# Patient Record
Sex: FEMALE | Race: WHITE | HISPANIC OR LATINO
[De-identification: names, ages, dates, MRNs, and addresses within clinical notes are randomized per-mention and may not be internally consistent; named-entity substitution may affect disease eponyms.]

---

## 2021-11-09 PROBLEM — Z00.00 ENCOUNTER FOR PREVENTIVE HEALTH EXAMINATION: Status: ACTIVE | Noted: 2021-11-09

## 2021-11-16 ENCOUNTER — APPOINTMENT (OUTPATIENT)
Dept: SURGICAL ONCOLOGY | Facility: CLINIC | Age: 61
End: 2021-11-16

## 2021-11-25 ENCOUNTER — TRANSCRIPTION ENCOUNTER (OUTPATIENT)
Age: 61
End: 2021-11-25

## 2021-11-26 ENCOUNTER — INPATIENT (INPATIENT)
Facility: HOSPITAL | Age: 61
LOS: 9 days | Discharge: HOME | DRG: 330 | End: 2021-12-06
Attending: SURGERY | Admitting: SURGERY
Payer: MEDICAID

## 2021-11-26 VITALS
RESPIRATION RATE: 16 BRPM | SYSTOLIC BLOOD PRESSURE: 129 MMHG | OXYGEN SATURATION: 96 % | DIASTOLIC BLOOD PRESSURE: 91 MMHG | HEART RATE: 80 BPM | TEMPERATURE: 99 F

## 2021-11-26 DIAGNOSIS — Z90.49 ACQUIRED ABSENCE OF OTHER SPECIFIED PARTS OF DIGESTIVE TRACT: Chronic | ICD-10-CM

## 2021-11-26 DIAGNOSIS — K56.609 UNSPECIFIED INTESTINAL OBSTRUCTION, UNSPECIFIED AS TO PARTIAL VERSUS COMPLETE OBSTRUCTION: ICD-10-CM

## 2021-11-26 DIAGNOSIS — Z90.710 ACQUIRED ABSENCE OF BOTH CERVIX AND UTERUS: Chronic | ICD-10-CM

## 2021-11-26 LAB
ABO RH CONFIRMATION: SIGNIFICANT CHANGE UP
ALBUMIN SERPL ELPH-MCNC: 3.4 G/DL — LOW (ref 3.5–5)
ALP SERPL-CCNC: 65 U/L — SIGNIFICANT CHANGE UP (ref 40–120)
ALT FLD-CCNC: 16 U/L DA — SIGNIFICANT CHANGE UP (ref 10–60)
ANION GAP SERPL CALC-SCNC: 14 MMOL/L — SIGNIFICANT CHANGE UP (ref 5–17)
APTT BLD: 28.7 SEC — SIGNIFICANT CHANGE UP (ref 27.5–35.5)
AST SERPL-CCNC: 12 U/L — SIGNIFICANT CHANGE UP (ref 10–40)
BASOPHILS # BLD AUTO: 0.06 K/UL — SIGNIFICANT CHANGE UP (ref 0–0.2)
BASOPHILS NFR BLD AUTO: 0.4 % — SIGNIFICANT CHANGE UP (ref 0–2)
BILIRUB SERPL-MCNC: 1.5 MG/DL — HIGH (ref 0.2–1.2)
BUN SERPL-MCNC: 26 MG/DL — HIGH (ref 7–18)
CALCIUM SERPL-MCNC: 9.7 MG/DL — SIGNIFICANT CHANGE UP (ref 8.4–10.5)
CHLORIDE SERPL-SCNC: 98 MMOL/L — SIGNIFICANT CHANGE UP (ref 96–108)
CO2 SERPL-SCNC: 26 MMOL/L — SIGNIFICANT CHANGE UP (ref 22–31)
CREAT SERPL-MCNC: 1.26 MG/DL — SIGNIFICANT CHANGE UP (ref 0.5–1.3)
EOSINOPHIL # BLD AUTO: 0.09 K/UL — SIGNIFICANT CHANGE UP (ref 0–0.5)
EOSINOPHIL NFR BLD AUTO: 0.6 % — SIGNIFICANT CHANGE UP (ref 0–6)
GLUCOSE SERPL-MCNC: 77 MG/DL — SIGNIFICANT CHANGE UP (ref 70–99)
HCT VFR BLD CALC: 39.5 % — SIGNIFICANT CHANGE UP (ref 34.5–45)
HGB BLD-MCNC: 13.3 G/DL — SIGNIFICANT CHANGE UP (ref 11.5–15.5)
IMM GRANULOCYTES NFR BLD AUTO: 0.5 % — SIGNIFICANT CHANGE UP (ref 0–1.5)
INR BLD: 1.05 RATIO — SIGNIFICANT CHANGE UP (ref 0.88–1.16)
LACTATE SERPL-SCNC: 1.4 MMOL/L — SIGNIFICANT CHANGE UP (ref 0.7–2)
LACTATE SERPL-SCNC: 2.7 MMOL/L — HIGH (ref 0.7–2)
LYMPHOCYTES # BLD AUTO: 1.15 K/UL — SIGNIFICANT CHANGE UP (ref 1–3.3)
LYMPHOCYTES # BLD AUTO: 7.2 % — LOW (ref 13–44)
MCHC RBC-ENTMCNC: 30.7 PG — SIGNIFICANT CHANGE UP (ref 27–34)
MCHC RBC-ENTMCNC: 33.7 GM/DL — SIGNIFICANT CHANGE UP (ref 32–36)
MCV RBC AUTO: 91.2 FL — SIGNIFICANT CHANGE UP (ref 80–100)
MONOCYTES # BLD AUTO: 1.07 K/UL — HIGH (ref 0–0.9)
MONOCYTES NFR BLD AUTO: 6.7 % — SIGNIFICANT CHANGE UP (ref 2–14)
NEUTROPHILS # BLD AUTO: 13.45 K/UL — HIGH (ref 1.8–7.4)
NEUTROPHILS NFR BLD AUTO: 84.6 % — HIGH (ref 43–77)
NRBC # BLD: 0 /100 WBCS — SIGNIFICANT CHANGE UP (ref 0–0)
PLATELET # BLD AUTO: 275 K/UL — SIGNIFICANT CHANGE UP (ref 150–400)
POTASSIUM SERPL-MCNC: 3.7 MMOL/L — SIGNIFICANT CHANGE UP (ref 3.5–5.3)
POTASSIUM SERPL-SCNC: 3.7 MMOL/L — SIGNIFICANT CHANGE UP (ref 3.5–5.3)
PROT SERPL-MCNC: 7.2 G/DL — SIGNIFICANT CHANGE UP (ref 6–8.3)
PROTHROM AB SERPL-ACNC: 12.5 SEC — SIGNIFICANT CHANGE UP (ref 10.6–13.6)
RBC # BLD: 4.33 M/UL — SIGNIFICANT CHANGE UP (ref 3.8–5.2)
RBC # FLD: 12.2 % — SIGNIFICANT CHANGE UP (ref 10.3–14.5)
SARS-COV-2 RNA SPEC QL NAA+PROBE: SIGNIFICANT CHANGE UP
SODIUM SERPL-SCNC: 138 MMOL/L — SIGNIFICANT CHANGE UP (ref 135–145)
WBC # BLD: 15.9 K/UL — HIGH (ref 3.8–10.5)
WBC # FLD AUTO: 15.9 K/UL — HIGH (ref 3.8–10.5)

## 2021-11-26 PROCEDURE — 44180 LAP ENTEROLYSIS: CPT | Mod: 22

## 2021-11-26 PROCEDURE — 71045 X-RAY EXAM CHEST 1 VIEW: CPT | Mod: 26

## 2021-11-26 PROCEDURE — 99223 1ST HOSP IP/OBS HIGH 75: CPT | Mod: 57

## 2021-11-26 PROCEDURE — 74177 CT ABD & PELVIS W/CONTRAST: CPT | Mod: 26,MA

## 2021-11-26 PROCEDURE — 99285 EMERGENCY DEPT VISIT HI MDM: CPT

## 2021-11-26 RX ORDER — MORPHINE SULFATE 50 MG/1
4 CAPSULE, EXTENDED RELEASE ORAL ONCE
Refills: 0 | Status: DISCONTINUED | OUTPATIENT
Start: 2021-11-26 | End: 2021-11-26

## 2021-11-26 RX ORDER — SODIUM CHLORIDE 9 MG/ML
1000 INJECTION INTRAMUSCULAR; INTRAVENOUS; SUBCUTANEOUS
Refills: 0 | Status: DISCONTINUED | OUTPATIENT
Start: 2021-11-26 | End: 2021-11-27

## 2021-11-26 RX ORDER — HYDROMORPHONE HYDROCHLORIDE 2 MG/ML
0.5 INJECTION INTRAMUSCULAR; INTRAVENOUS; SUBCUTANEOUS
Refills: 0 | Status: DISCONTINUED | OUTPATIENT
Start: 2021-11-26 | End: 2021-11-28

## 2021-11-26 RX ORDER — HYDROMORPHONE HYDROCHLORIDE 2 MG/ML
1 INJECTION INTRAMUSCULAR; INTRAVENOUS; SUBCUTANEOUS
Refills: 0 | Status: DISCONTINUED | OUTPATIENT
Start: 2021-11-26 | End: 2021-11-26

## 2021-11-26 RX ORDER — METOPROLOL TARTRATE 50 MG
5 TABLET ORAL EVERY 6 HOURS
Refills: 0 | Status: DISCONTINUED | OUTPATIENT
Start: 2021-11-26 | End: 2021-11-26

## 2021-11-26 RX ORDER — DEXTROSE 50 % IN WATER 50 %
12.5 SYRINGE (ML) INTRAVENOUS ONCE
Refills: 0 | Status: DISCONTINUED | OUTPATIENT
Start: 2021-11-26 | End: 2021-11-26

## 2021-11-26 RX ORDER — SODIUM CHLORIDE 9 MG/ML
1000 INJECTION, SOLUTION INTRAVENOUS
Refills: 0 | Status: DISCONTINUED | OUTPATIENT
Start: 2021-11-26 | End: 2021-11-26

## 2021-11-26 RX ORDER — PANTOPRAZOLE SODIUM 20 MG/1
40 TABLET, DELAYED RELEASE ORAL DAILY
Refills: 0 | Status: DISCONTINUED | OUTPATIENT
Start: 2021-11-26 | End: 2021-11-26

## 2021-11-26 RX ORDER — METOCLOPRAMIDE HCL 10 MG
10 TABLET ORAL ONCE
Refills: 0 | Status: COMPLETED | OUTPATIENT
Start: 2021-11-26 | End: 2021-11-28

## 2021-11-26 RX ORDER — ONDANSETRON 8 MG/1
4 TABLET, FILM COATED ORAL EVERY 6 HOURS
Refills: 0 | Status: DISCONTINUED | OUTPATIENT
Start: 2021-11-26 | End: 2021-11-26

## 2021-11-26 RX ORDER — INSULIN LISPRO 100/ML
VIAL (ML) SUBCUTANEOUS
Refills: 0 | Status: DISCONTINUED | OUTPATIENT
Start: 2021-11-26 | End: 2021-11-26

## 2021-11-26 RX ORDER — HYDROMORPHONE HYDROCHLORIDE 2 MG/ML
0.5 INJECTION INTRAMUSCULAR; INTRAVENOUS; SUBCUTANEOUS
Refills: 0 | Status: DISCONTINUED | OUTPATIENT
Start: 2021-11-26 | End: 2021-11-27

## 2021-11-26 RX ORDER — ONDANSETRON 8 MG/1
4 TABLET, FILM COATED ORAL ONCE
Refills: 0 | Status: COMPLETED | OUTPATIENT
Start: 2021-11-26 | End: 2021-11-26

## 2021-11-26 RX ORDER — HYDROMORPHONE HYDROCHLORIDE 2 MG/ML
0.5 INJECTION INTRAMUSCULAR; INTRAVENOUS; SUBCUTANEOUS
Refills: 0 | Status: DISCONTINUED | OUTPATIENT
Start: 2021-11-26 | End: 2021-11-26

## 2021-11-26 RX ORDER — ENOXAPARIN SODIUM 100 MG/ML
40 INJECTION SUBCUTANEOUS DAILY
Refills: 0 | Status: DISCONTINUED | OUTPATIENT
Start: 2021-11-26 | End: 2021-11-26

## 2021-11-26 RX ORDER — SODIUM CHLORIDE 9 MG/ML
1000 INJECTION INTRAMUSCULAR; INTRAVENOUS; SUBCUTANEOUS
Refills: 0 | Status: DISCONTINUED | OUTPATIENT
Start: 2021-11-26 | End: 2021-11-26

## 2021-11-26 RX ORDER — DEXTROSE 50 % IN WATER 50 %
15 SYRINGE (ML) INTRAVENOUS ONCE
Refills: 0 | Status: DISCONTINUED | OUTPATIENT
Start: 2021-11-26 | End: 2021-11-26

## 2021-11-26 RX ORDER — SODIUM CHLORIDE 9 MG/ML
1000 INJECTION INTRAMUSCULAR; INTRAVENOUS; SUBCUTANEOUS ONCE
Refills: 0 | Status: COMPLETED | OUTPATIENT
Start: 2021-11-26 | End: 2021-11-26

## 2021-11-26 RX ORDER — GLUCAGON INJECTION, SOLUTION 0.5 MG/.1ML
1 INJECTION, SOLUTION SUBCUTANEOUS ONCE
Refills: 0 | Status: DISCONTINUED | OUTPATIENT
Start: 2021-11-26 | End: 2021-11-26

## 2021-11-26 RX ORDER — KETOROLAC TROMETHAMINE 30 MG/ML
15 SYRINGE (ML) INJECTION EVERY 6 HOURS
Refills: 0 | Status: DISCONTINUED | OUTPATIENT
Start: 2021-11-26 | End: 2021-11-28

## 2021-11-26 RX ORDER — DEXTROSE 50 % IN WATER 50 %
25 SYRINGE (ML) INTRAVENOUS ONCE
Refills: 0 | Status: DISCONTINUED | OUTPATIENT
Start: 2021-11-26 | End: 2021-11-26

## 2021-11-26 RX ORDER — ACETAMINOPHEN 500 MG
1000 TABLET ORAL ONCE
Refills: 0 | Status: COMPLETED | OUTPATIENT
Start: 2021-11-26 | End: 2021-11-26

## 2021-11-26 RX ADMIN — Medication 400 MILLIGRAM(S): at 22:56

## 2021-11-26 RX ADMIN — PANTOPRAZOLE SODIUM 40 MILLIGRAM(S): 20 TABLET, DELAYED RELEASE ORAL at 14:02

## 2021-11-26 RX ADMIN — Medication 1000 MILLIGRAM(S): at 23:30

## 2021-11-26 RX ADMIN — ENOXAPARIN SODIUM 40 MILLIGRAM(S): 100 INJECTION SUBCUTANEOUS at 14:01

## 2021-11-26 RX ADMIN — SODIUM CHLORIDE 1000 MILLILITER(S): 9 INJECTION INTRAMUSCULAR; INTRAVENOUS; SUBCUTANEOUS at 06:06

## 2021-11-26 RX ADMIN — SODIUM CHLORIDE 130 MILLILITER(S): 9 INJECTION INTRAMUSCULAR; INTRAVENOUS; SUBCUTANEOUS at 14:02

## 2021-11-26 RX ADMIN — MORPHINE SULFATE 4 MILLIGRAM(S): 50 CAPSULE, EXTENDED RELEASE ORAL at 06:06

## 2021-11-26 RX ADMIN — ONDANSETRON 4 MILLIGRAM(S): 8 TABLET, FILM COATED ORAL at 06:06

## 2021-11-26 RX ADMIN — MORPHINE SULFATE 4 MILLIGRAM(S): 50 CAPSULE, EXTENDED RELEASE ORAL at 09:51

## 2021-11-26 NOTE — H&P ADULT - NSHPLABSRESULTS_GEN_ALL_CORE
13.3   15.90 )-----------( 275      ( 26 Nov 2021 06:03 )             39.5   11-26    138  |  98  |  26<H>  ----------------------------<  77  3.7   |  26  |  1.26    Ca    9.7      26 Nov 2021 06:03    TPro  7.2  /  Alb  3.4<L>  /  TBili  1.5<H>  /  DBili  x   /  AST  12  /  ALT  16  /  AlkPhos  65  11-26    Lactate 2.7 > 1.4    < from: CT Abdomen and Pelvis w/ IV Cont (11.26.21 @ 07:01) >  EXAM:  CT ABDOMEN AND PELVIS IC                        PROCEDURE DATE:  11/26/2021    INTERPRETATION:  CLINICAL INFORMATION: Abdominal pain and vomiting.    COMPARISON: None.    CONTRAST/COMPLICATIONS:  IV Contrast: Omnipaque 350  90 cc administered   10 cc discarded  Oral Contrast: NONE  Complications: None reported at time of study completion    PROCEDURE:  CT of the Abdomen and Pelvis was performed.  Sagittal and coronal reformats were performed.    FINDINGS:  LOWER CHEST: Within normal limits.  LIVER: Focal hepatic steatosis along the falciform ligament.  BILE DUCTS: Normal caliber.  GALLBLADDER: Gallbladder distended with mild gallbladder wall thickening. Trace nonspecific pericholecystic fluid.  SPLEEN: Within normal limits.  PANCREAS: Within normal limits.  ADRENALS: Within normal limits.  KIDNEYS/URETERS: Kidneys enhance symmetrically without hydronephrosis. Focal areas of left renal cortical thinning/scarring. Subcentimeter low-attenuation lesion in the right kidney which is too small to characterize.  BLADDER: Within normal limits.  REPRODUCTIVE ORGANS: Uterus is not visualized. No pelvic mass or cyst.  BOWEL and peritoneum: Numerous dilated fluid-filled small bowel loops throughout the central abdomen withmesenteric edema, interloop fluid, and small volume ascites. Abrupt transition point in the right lower quadrant at a fecal lies segment (series 2:109). Distal small bowel loops are decompressed. No pneumoperitoneum. Appendix is not visualized. Mild colonic diverticulosis without evidence of diverticulitis. No mesenteric lymphadenopathy.  VESSELS: Within normal limits.  RETROPERITONEUM/LYMPH NODES: No lymphadenopathy.  ABDOMINAL WALL: Tiny fat-containing umbilical hernia. Postsurgical changes.  BONES: Degenerative changes of the spine.    IMPRESSION:  High-grade small bowel obstruction with abrupt transition point in the right lower quadrant (series 2:109). Associated mesenteric edema, interloop fluid, and small volume ascites.    --- Endof Report ---    ALONZO BECERRA DO; Attending Radiologist  This document has been electronically signed. Nov 26 2021  7:43AM    < end of copied text >

## 2021-11-26 NOTE — ED PROVIDER NOTE - PROGRESS NOTE DETAILS
[place]:  Pt received in signout from Dr. Ortiz pending CT.  CT shows SBO.  Pt initially could not be found but may have been in lavatory.  Pt now reassessed and still has abdominal pain but no recent vomiting (since 5 am).  Lactate repeat ordered.  Surgery paged, pending response. Pt seen by surgery PA and will be admitted to Dr. Friend.  NG tube was placed.

## 2021-11-26 NOTE — H&P ADULT - ASSESSMENT
61F with PSH of open hysterectomy and appendectomy presenting with abdominal pain associated with nausea/vomiting. Found to have small bowel obstruction with transition point in RLQ    -Admit to surgery under Dr. Friend  -NGT placed at bedside, f/u abdominal xray  -NPO, IVF  -Serial abdominal exams  -If patient clinically worsens plan for possible diagnostic laparoscopy  -Hold pain medications  -C/w Home Medication  -DVT prophylaxis   61F with PSH of open hysterectomy and appendectomy presenting with abdominal pain associated with nausea/vomiting. Found to have small bowel obstruction with transition point in RLQ    -Admit to surgery under Dr. Friend  -NGT placed at bedside, f/u xray  -NPO, IVF  -Serial abdominal exams  -If patient clinically worsens plan for possible diagnostic laparoscopy  -Hold pain medications  -C/w Home Medication  -DVT prophylaxis

## 2021-11-26 NOTE — H&P ADULT - NSHPPHYSICALEXAM_GEN_ALL_CORE
Vital Signs Last 24 Hrs  T(C): 37.2 (26 Nov 2021 05:34), Max: 37.2 (26 Nov 2021 05:34)  T(F): 98.9 (26 Nov 2021 05:34), Max: 98.9 (26 Nov 2021 05:34)  HR: 80 (26 Nov 2021 05:34) (80 - 80)  BP: 129/91 (26 Nov 2021 05:34) (129/91 - 129/91)  RR: 16 (26 Nov 2021 05:34) (16 - 16)  SpO2: 96% (26 Nov 2021 05:34) (96% - 96%)    Gen: A&Ox3, NAD  ENT: No nasal deformity, nares patent, septum intact without deviation  Chest: respiration unlabored  Abdomen: Soft, mildly distended, diffuse abdominal tenderness with right lower quadrant localized peritonitis.  Ext: no calf tenderness, no edema

## 2021-11-26 NOTE — PATIENT PROFILE ADULT - STATED REASON FOR ADMISSION
Abdominal pain and episodes of vomit Abdominal pain and episodes of vomit, then she went to the ED.

## 2021-11-26 NOTE — ED PROVIDER NOTE - OBJECTIVE STATEMENT
61 year old female PMH HTN, DM coming in with abd pain, distention, nausea, and vomiting feces. states started 2 days ago and progressively getting worse. initially thought it was a virus but this morning vomited up what appeared to be feces. denies all other complaints. never had this happen before. hx exlap/hysterectomy. not passing gas and no BM for 2 days.

## 2021-11-26 NOTE — H&P ADULT - HISTORY OF PRESENT ILLNESS
61F with PMHx of HTN, HLD, DM (on metformin) PSHx open hysterectomy, open appendectomy, and  presents with abdominal pain associated with nausea and feculent vomiting x 2 days. Pt states she taught it was a stomach virus but symptoms began to worsen over the last todays. Never experienced this in the past. Denied fever, chills, flatus, or bowel function over the last 2 days.

## 2021-11-26 NOTE — ED PROVIDER NOTE - CLINICAL SUMMARY MEDICAL DECISION MAKING FREE TEXT BOX
61 year old female with abd pain and N/V with feces. PE as above.  labs, ct abdomen, pain control, zofran, fluids bolus

## 2021-11-27 LAB
A1C WITH ESTIMATED AVERAGE GLUCOSE RESULT: 5.2 % — SIGNIFICANT CHANGE UP (ref 4–5.6)
ANION GAP SERPL CALC-SCNC: 10 MMOL/L — SIGNIFICANT CHANGE UP (ref 5–17)
ANISOCYTOSIS BLD QL: SLIGHT — SIGNIFICANT CHANGE UP
BASOPHILS # BLD AUTO: 0 K/UL — SIGNIFICANT CHANGE UP (ref 0–0.2)
BASOPHILS NFR BLD AUTO: 0 % — SIGNIFICANT CHANGE UP (ref 0–2)
BUN SERPL-MCNC: 29 MG/DL — HIGH (ref 7–18)
CALCIUM SERPL-MCNC: 7.5 MG/DL — LOW (ref 8.4–10.5)
CHLORIDE SERPL-SCNC: 107 MMOL/L — SIGNIFICANT CHANGE UP (ref 96–108)
CO2 SERPL-SCNC: 23 MMOL/L — SIGNIFICANT CHANGE UP (ref 22–31)
COVID-19 NUCLEOCAPSID GAM AB INTERP: NEGATIVE — SIGNIFICANT CHANGE UP
COVID-19 NUCLEOCAPSID TOTAL GAM ANTIBODY RESULT: 0.07 INDEX — SIGNIFICANT CHANGE UP
COVID-19 SPIKE DOMAIN AB INTERP: POSITIVE
COVID-19 SPIKE DOMAIN ANTIBODY RESULT: >250 U/ML — HIGH
CREAT SERPL-MCNC: 1.18 MG/DL — SIGNIFICANT CHANGE UP (ref 0.5–1.3)
EOSINOPHIL # BLD AUTO: 0 K/UL — SIGNIFICANT CHANGE UP (ref 0–0.5)
EOSINOPHIL NFR BLD AUTO: 0 % — SIGNIFICANT CHANGE UP (ref 0–6)
ESTIMATED AVERAGE GLUCOSE: 103 MG/DL — SIGNIFICANT CHANGE UP (ref 68–114)
GLUCOSE SERPL-MCNC: 73 MG/DL — SIGNIFICANT CHANGE UP (ref 70–99)
HCT VFR BLD CALC: 31.5 % — LOW (ref 34.5–45)
HCT VFR BLD CALC: 36.2 % — SIGNIFICANT CHANGE UP (ref 34.5–45)
HCV AB S/CO SERPL IA: 0.07 S/CO — SIGNIFICANT CHANGE UP (ref 0–0.99)
HCV AB SERPL-IMP: SIGNIFICANT CHANGE UP
HGB BLD-MCNC: 10.6 G/DL — LOW (ref 11.5–15.5)
HGB BLD-MCNC: 12.4 G/DL — SIGNIFICANT CHANGE UP (ref 11.5–15.5)
HYPOSEGMENTATION: PRESENT — SIGNIFICANT CHANGE UP
LYMPHOCYTES # BLD AUTO: 0.53 K/UL — LOW (ref 1–3.3)
LYMPHOCYTES # BLD AUTO: 20 % — SIGNIFICANT CHANGE UP (ref 13–44)
MANUAL SMEAR VERIFICATION: SIGNIFICANT CHANGE UP
MCHC RBC-ENTMCNC: 30.8 PG — SIGNIFICANT CHANGE UP (ref 27–34)
MCHC RBC-ENTMCNC: 31.2 PG — SIGNIFICANT CHANGE UP (ref 27–34)
MCHC RBC-ENTMCNC: 33.7 GM/DL — SIGNIFICANT CHANGE UP (ref 32–36)
MCHC RBC-ENTMCNC: 34.3 GM/DL — SIGNIFICANT CHANGE UP (ref 32–36)
MCV RBC AUTO: 89.8 FL — SIGNIFICANT CHANGE UP (ref 80–100)
MCV RBC AUTO: 92.6 FL — SIGNIFICANT CHANGE UP (ref 80–100)
MONOCYTES # BLD AUTO: 0.27 K/UL — SIGNIFICANT CHANGE UP (ref 0–0.9)
MONOCYTES NFR BLD AUTO: 10 % — SIGNIFICANT CHANGE UP (ref 2–14)
NEUTROPHILS # BLD AUTO: 1.86 K/UL — SIGNIFICANT CHANGE UP (ref 1.8–7.4)
NEUTROPHILS NFR BLD AUTO: 69 % — SIGNIFICANT CHANGE UP (ref 43–77)
NEUTS BAND # BLD: 1 % — SIGNIFICANT CHANGE UP (ref 0–8)
NRBC # BLD: 0 /100 WBCS — SIGNIFICANT CHANGE UP (ref 0–0)
NRBC # BLD: 0 /100 — SIGNIFICANT CHANGE UP (ref 0–0)
PLAT MORPH BLD: NORMAL — SIGNIFICANT CHANGE UP
PLATELET # BLD AUTO: 128 K/UL — LOW (ref 150–400)
PLATELET # BLD AUTO: 222 K/UL — SIGNIFICANT CHANGE UP (ref 150–400)
PLATELET COUNT - ESTIMATE: ABNORMAL
POLYCHROMASIA BLD QL SMEAR: SLIGHT — SIGNIFICANT CHANGE UP
POTASSIUM SERPL-MCNC: 3.5 MMOL/L — SIGNIFICANT CHANGE UP (ref 3.5–5.3)
POTASSIUM SERPL-SCNC: 3.5 MMOL/L — SIGNIFICANT CHANGE UP (ref 3.5–5.3)
RBC # BLD: 3.4 M/UL — LOW (ref 3.8–5.2)
RBC # BLD: 4.03 M/UL — SIGNIFICANT CHANGE UP (ref 3.8–5.2)
RBC # FLD: 12.5 % — SIGNIFICANT CHANGE UP (ref 10.3–14.5)
RBC # FLD: 12.7 % — SIGNIFICANT CHANGE UP (ref 10.3–14.5)
RBC BLD AUTO: ABNORMAL
SARS-COV-2 IGG+IGM SERPL QL IA: 0.07 INDEX — SIGNIFICANT CHANGE UP
SARS-COV-2 IGG+IGM SERPL QL IA: >250 U/ML — HIGH
SARS-COV-2 IGG+IGM SERPL QL IA: NEGATIVE — SIGNIFICANT CHANGE UP
SARS-COV-2 IGG+IGM SERPL QL IA: POSITIVE
SODIUM SERPL-SCNC: 140 MMOL/L — SIGNIFICANT CHANGE UP (ref 135–145)
WBC # BLD: 10.91 K/UL — HIGH (ref 3.8–10.5)
WBC # BLD: 2.65 K/UL — LOW (ref 3.8–10.5)
WBC # FLD AUTO: 10.91 K/UL — HIGH (ref 3.8–10.5)
WBC # FLD AUTO: 2.65 K/UL — LOW (ref 3.8–10.5)

## 2021-11-27 RX ORDER — SODIUM CHLORIDE 9 MG/ML
500 INJECTION, SOLUTION INTRAVENOUS ONCE
Refills: 0 | Status: COMPLETED | OUTPATIENT
Start: 2021-11-27 | End: 2021-11-27

## 2021-11-27 RX ORDER — SODIUM CHLORIDE 9 MG/ML
1000 INJECTION, SOLUTION INTRAVENOUS
Refills: 0 | Status: DISCONTINUED | OUTPATIENT
Start: 2021-11-27 | End: 2021-12-06

## 2021-11-27 RX ORDER — ENOXAPARIN SODIUM 100 MG/ML
40 INJECTION SUBCUTANEOUS DAILY
Refills: 0 | Status: DISCONTINUED | OUTPATIENT
Start: 2021-11-27 | End: 2021-11-28

## 2021-11-27 RX ORDER — DEXTROSE 50 % IN WATER 50 %
12.5 SYRINGE (ML) INTRAVENOUS ONCE
Refills: 0 | Status: DISCONTINUED | OUTPATIENT
Start: 2021-11-27 | End: 2021-12-06

## 2021-11-27 RX ORDER — DEXTROSE 50 % IN WATER 50 %
15 SYRINGE (ML) INTRAVENOUS ONCE
Refills: 0 | Status: DISCONTINUED | OUTPATIENT
Start: 2021-11-27 | End: 2021-12-06

## 2021-11-27 RX ORDER — ACETAMINOPHEN 500 MG
1000 TABLET ORAL ONCE
Refills: 0 | Status: COMPLETED | OUTPATIENT
Start: 2021-11-27 | End: 2021-11-29

## 2021-11-27 RX ORDER — GLUCAGON INJECTION, SOLUTION 0.5 MG/.1ML
1 INJECTION, SOLUTION SUBCUTANEOUS ONCE
Refills: 0 | Status: DISCONTINUED | OUTPATIENT
Start: 2021-11-27 | End: 2021-12-06

## 2021-11-27 RX ORDER — DEXTROSE 50 % IN WATER 50 %
25 SYRINGE (ML) INTRAVENOUS ONCE
Refills: 0 | Status: DISCONTINUED | OUTPATIENT
Start: 2021-11-27 | End: 2021-12-06

## 2021-11-27 RX ORDER — ZOLPIDEM TARTRATE 10 MG/1
5 TABLET ORAL AT BEDTIME
Refills: 0 | Status: DISCONTINUED | OUTPATIENT
Start: 2021-11-27 | End: 2021-12-04

## 2021-11-27 RX ADMIN — Medication 15 MILLIGRAM(S): at 01:20

## 2021-11-27 RX ADMIN — Medication 15 MILLIGRAM(S): at 05:43

## 2021-11-27 RX ADMIN — Medication 1 DROP(S): at 18:52

## 2021-11-27 RX ADMIN — Medication 15 MILLIGRAM(S): at 14:00

## 2021-11-27 RX ADMIN — ENOXAPARIN SODIUM 40 MILLIGRAM(S): 100 INJECTION SUBCUTANEOUS at 13:41

## 2021-11-27 RX ADMIN — Medication 15 MILLIGRAM(S): at 13:41

## 2021-11-27 RX ADMIN — Medication 15 MILLIGRAM(S): at 01:02

## 2021-11-27 RX ADMIN — SODIUM CHLORIDE 75 MILLILITER(S): 9 INJECTION INTRAMUSCULAR; INTRAVENOUS; SUBCUTANEOUS at 01:15

## 2021-11-27 RX ADMIN — Medication 15 MILLIGRAM(S): at 05:33

## 2021-11-27 RX ADMIN — ZOLPIDEM TARTRATE 5 MILLIGRAM(S): 10 TABLET ORAL at 20:57

## 2021-11-27 RX ADMIN — SODIUM CHLORIDE 500 MILLILITER(S): 9 INJECTION, SOLUTION INTRAVENOUS at 02:01

## 2021-11-27 RX ADMIN — SODIUM CHLORIDE 500 MILLILITER(S): 9 INJECTION, SOLUTION INTRAVENOUS at 02:49

## 2021-11-27 RX ADMIN — SODIUM CHLORIDE 75 MILLILITER(S): 9 INJECTION INTRAMUSCULAR; INTRAVENOUS; SUBCUTANEOUS at 01:05

## 2021-11-27 NOTE — PROGRESS NOTE ADULT - SUBJECTIVE AND OBJECTIVE BOX
POST-OPERATIVE NOTE    Subjective:   61y Female s/p diagnostic laparoscopy, DIOGENES POD #0  . Denies nausea, vomiting, chest pain, sob, fevers chills. Pain is well controlled. Ambulating independently. Voiding spontaneously.    Vital Signs Last 24 Hrs  T(C): 36.3 (27 Nov 2021 05:20), Max: 37.4 (26 Nov 2021 15:32)  T(F): 97.4 (27 Nov 2021 05:20), Max: 99.4 (26 Nov 2021 15:32)  HR: 74 (27 Nov 2021 05:20) (66 - 86)  BP: 109/71 (27 Nov 2021 05:20) (83/51 - 146/92)  BP(mean): 61 (27 Nov 2021 03:18) (58 - 95)  RR: 15 (27 Nov 2021 05:20) (14 - 20)  SpO2: 98% (27 Nov 2021 05:20) (93% - 98%)  I&O's Detail    26 Nov 2021 07:01  -  27 Nov 2021 06:22  --------------------------------------------------------  IN:    Other (mL): 1200 mL  Total IN: 1200 mL    OUT:    Indwelling Catheter - Urethral (mL): 50 mL    Other (mL): 50 mL  Total OUT: 100 mL    Total NET: 1100 mL          Physical Exam:  General: NAD, resting comfortably in bed  Pulmonary: Nonlabored breathing, no respiratory distress  Cardiovascular: NSR, S1, S2  Abdominal: soft, NT/ND, dressing c/d/i  Extremities: no edema, no calf tenderness, distal pulses are palpable     LABS:                        13.3   15.90 )-----------( 275      ( 26 Nov 2021 06:03 )             39.5     11-26    138  |  98  |  26<H>  ----------------------------<  77  3.7   |  26  |  1.26    Ca    9.7      26 Nov 2021 06:03    TPro  7.2  /  Alb  3.4<L>  /  TBili  1.5<H>  /  DBili  x   /  AST  12  /  ALT  16  /  AlkPhos  65  11-26    LIVER FUNCTIONS - ( 26 Nov 2021 06:03 )  Alb: 3.4 g/dL / Pro: 7.2 g/dL / ALK PHOS: 65 U/L / ALT: 16 U/L DA / AST: 12 U/L / GGT: x             MEDICATIONS  (STANDING):  ketorolac   Injectable 15 milliGRAM(s) IV Push every 6 hours  metoclopramide Injectable 10 milliGRAM(s) IV Push once  sodium chloride 0.9%. 1000 milliLiter(s) (75 mL/Hr) IV Continuous <Continuous>    MEDICATIONS  (PRN):  HYDROmorphone   Tablet 0.5 milliGRAM(s) Oral every 3 hours PRN Severe Pain (7 - 10)  HYDROmorphone  Injectable 0.5 milliGRAM(s) IV Push every 3 hours PRN Moderate Pain (4 - 6)      Assessment:   61y Female who is s/p diagnostic laparoscopy, DIOGENES POD #0  Stable.    Plan:  - Pain control as needed  -Dressing change prn   - OOB and ambulating as tolerated  - F/u AM labs  - DVT ppx

## 2021-11-27 NOTE — PROGRESS NOTE ADULT - SUBJECTIVE AND OBJECTIVE BOX
61F with PMHx of HTN, HLD, DM (on metformin) PSHx open hysterectomy, open appendectomy, and  presents with abdominal pain associated with nausea and feculent vomiting x 2 days. On imaging noted to have small bowel obstruction with transition point in RLQ. Pt. is POD # 1 from diagnostic laparoscopy, lysis of adhesions, and repair of enterotomies. Pt. seen this morning and appears well. Denies any  nausea or vomiting. Denies passing any flatus or bowel movement since surgery. pain is controlled.     Vital Signs Last 24 Hrs  T(C): 36.3 (2021 05:20), Max: 37.4 (2021 15:32)  T(F): 97.4 (2021 05:20), Max: 99.4 (2021 15:32)  HR: 74 (2021 05:20) (66 - 86)  BP: 109/71 (2021 05:20) (83/51 - 146/92)  BP(mean): 61 (2021 03:18) (58 - 95)  RR: 15 (2021 05:20) (14 - 20)  SpO2: 98% (2021 05:20) (93% - 98%)    no acute distress, AAOX3   non labored breathing, saturating well on room air   abd is soft, appropriately tender post surgical more so in RLQ,  slightly distended   full ROM x 4  skin well perfused                         10.6   2.65  )-----------( 128      ( 2021 07:01 )             31.5       140  |  107  |  29<H>  ----------------------------<  73  3.5   |  23  |  1.18    Ca    7.5<L>      2021 07:01    TPro  7.2  /  Alb  3.4<L>  /  TBili  1.5<H>  /  DBili  x   /  AST  12  /  ALT  16  /  AlkPhos  65

## 2021-11-28 LAB
ANION GAP SERPL CALC-SCNC: 8 MMOL/L — SIGNIFICANT CHANGE UP (ref 5–17)
BUN SERPL-MCNC: 44 MG/DL — HIGH (ref 7–18)
CALCIUM SERPL-MCNC: 8 MG/DL — LOW (ref 8.4–10.5)
CHLORIDE SERPL-SCNC: 101 MMOL/L — SIGNIFICANT CHANGE UP (ref 96–108)
CO2 SERPL-SCNC: 29 MMOL/L — SIGNIFICANT CHANGE UP (ref 22–31)
CREAT SERPL-MCNC: 1.62 MG/DL — HIGH (ref 0.5–1.3)
GLUCOSE BLDC GLUCOMTR-MCNC: 150 MG/DL — HIGH (ref 70–99)
GLUCOSE SERPL-MCNC: 134 MG/DL — HIGH (ref 70–99)
HCT VFR BLD CALC: 31.1 % — LOW (ref 34.5–45)
HGB BLD-MCNC: 10.6 G/DL — LOW (ref 11.5–15.5)
MAGNESIUM SERPL-MCNC: 1.5 MG/DL — LOW (ref 1.6–2.6)
MCHC RBC-ENTMCNC: 30.6 PG — SIGNIFICANT CHANGE UP (ref 27–34)
MCHC RBC-ENTMCNC: 34.1 GM/DL — SIGNIFICANT CHANGE UP (ref 32–36)
MCV RBC AUTO: 89.9 FL — SIGNIFICANT CHANGE UP (ref 80–100)
NRBC # BLD: 0 /100 WBCS — SIGNIFICANT CHANGE UP (ref 0–0)
PHOSPHATE SERPL-MCNC: 4.1 MG/DL — SIGNIFICANT CHANGE UP (ref 2.5–4.5)
PLATELET # BLD AUTO: 149 K/UL — LOW (ref 150–400)
POTASSIUM SERPL-MCNC: 3.8 MMOL/L — SIGNIFICANT CHANGE UP (ref 3.5–5.3)
POTASSIUM SERPL-SCNC: 3.8 MMOL/L — SIGNIFICANT CHANGE UP (ref 3.5–5.3)
RBC # BLD: 3.46 M/UL — LOW (ref 3.8–5.2)
RBC # FLD: 12.8 % — SIGNIFICANT CHANGE UP (ref 10.3–14.5)
SODIUM SERPL-SCNC: 138 MMOL/L — SIGNIFICANT CHANGE UP (ref 135–145)
WBC # BLD: 6.44 K/UL — SIGNIFICANT CHANGE UP (ref 3.8–10.5)
WBC # FLD AUTO: 6.44 K/UL — SIGNIFICANT CHANGE UP (ref 3.8–10.5)

## 2021-11-28 PROCEDURE — 93970 EXTREMITY STUDY: CPT | Mod: 26

## 2021-11-28 RX ORDER — METOPROLOL TARTRATE 50 MG
5 TABLET ORAL EVERY 6 HOURS
Refills: 0 | Status: DISCONTINUED | OUTPATIENT
Start: 2021-11-28 | End: 2021-12-01

## 2021-11-28 RX ORDER — SIMETHICONE 80 MG/1
80 TABLET, CHEWABLE ORAL EVERY 6 HOURS
Refills: 0 | Status: DISCONTINUED | OUTPATIENT
Start: 2021-11-28 | End: 2021-12-06

## 2021-11-28 RX ORDER — ACETAMINOPHEN 500 MG
1000 TABLET ORAL ONCE
Refills: 0 | Status: DISCONTINUED | OUTPATIENT
Start: 2021-11-28 | End: 2021-12-01

## 2021-11-28 RX ORDER — DEXTROSE MONOHYDRATE, SODIUM CHLORIDE, AND POTASSIUM CHLORIDE 50; .745; 4.5 G/1000ML; G/1000ML; G/1000ML
1000 INJECTION, SOLUTION INTRAVENOUS
Refills: 0 | Status: DISCONTINUED | OUTPATIENT
Start: 2021-11-28 | End: 2021-11-30

## 2021-11-28 RX ORDER — HYDROMORPHONE HYDROCHLORIDE 2 MG/ML
0.5 INJECTION INTRAMUSCULAR; INTRAVENOUS; SUBCUTANEOUS
Refills: 0 | Status: DISCONTINUED | OUTPATIENT
Start: 2021-11-28 | End: 2021-12-01

## 2021-11-28 RX ORDER — ONDANSETRON 8 MG/1
4 TABLET, FILM COATED ORAL EVERY 6 HOURS
Refills: 0 | Status: DISCONTINUED | OUTPATIENT
Start: 2021-11-28 | End: 2021-12-06

## 2021-11-28 RX ORDER — ACETAMINOPHEN 500 MG
1000 TABLET ORAL ONCE
Refills: 0 | Status: COMPLETED | OUTPATIENT
Start: 2021-11-28 | End: 2021-11-28

## 2021-11-28 RX ADMIN — Medication 10 MILLIGRAM(S): at 01:57

## 2021-11-28 RX ADMIN — ONDANSETRON 4 MILLIGRAM(S): 8 TABLET, FILM COATED ORAL at 20:27

## 2021-11-28 RX ADMIN — DEXTROSE MONOHYDRATE, SODIUM CHLORIDE, AND POTASSIUM CHLORIDE 100 MILLILITER(S): 50; .745; 4.5 INJECTION, SOLUTION INTRAVENOUS at 21:36

## 2021-11-28 RX ADMIN — HYDROMORPHONE HYDROCHLORIDE 0.5 MILLIGRAM(S): 2 INJECTION INTRAMUSCULAR; INTRAVENOUS; SUBCUTANEOUS at 10:51

## 2021-11-28 RX ADMIN — HYDROMORPHONE HYDROCHLORIDE 0.5 MILLIGRAM(S): 2 INJECTION INTRAMUSCULAR; INTRAVENOUS; SUBCUTANEOUS at 14:45

## 2021-11-28 RX ADMIN — DEXTROSE MONOHYDRATE, SODIUM CHLORIDE, AND POTASSIUM CHLORIDE 100 MILLILITER(S): 50; .745; 4.5 INJECTION, SOLUTION INTRAVENOUS at 10:36

## 2021-11-28 RX ADMIN — HYDROMORPHONE HYDROCHLORIDE 0.5 MILLIGRAM(S): 2 INJECTION INTRAMUSCULAR; INTRAVENOUS; SUBCUTANEOUS at 14:28

## 2021-11-28 RX ADMIN — HYDROMORPHONE HYDROCHLORIDE 0.5 MILLIGRAM(S): 2 INJECTION INTRAMUSCULAR; INTRAVENOUS; SUBCUTANEOUS at 20:00

## 2021-11-28 RX ADMIN — HYDROMORPHONE HYDROCHLORIDE 0.5 MILLIGRAM(S): 2 INJECTION INTRAMUSCULAR; INTRAVENOUS; SUBCUTANEOUS at 11:15

## 2021-11-28 RX ADMIN — Medication 400 MILLIGRAM(S): at 10:35

## 2021-11-28 RX ADMIN — Medication 5 MILLIGRAM(S): at 17:57

## 2021-11-28 RX ADMIN — Medication 5 MILLIGRAM(S): at 23:37

## 2021-11-28 RX ADMIN — ONDANSETRON 4 MILLIGRAM(S): 8 TABLET, FILM COATED ORAL at 14:13

## 2021-11-28 RX ADMIN — HYDROMORPHONE HYDROCHLORIDE 0.5 MILLIGRAM(S): 2 INJECTION INTRAMUSCULAR; INTRAVENOUS; SUBCUTANEOUS at 19:40

## 2021-11-28 RX ADMIN — ONDANSETRON 4 MILLIGRAM(S): 8 TABLET, FILM COATED ORAL at 07:38

## 2021-11-28 RX ADMIN — Medication 5 MILLIGRAM(S): at 11:57

## 2021-11-28 RX ADMIN — SIMETHICONE 80 MILLIGRAM(S): 80 TABLET, CHEWABLE ORAL at 11:10

## 2021-11-28 RX ADMIN — Medication 1000 MILLIGRAM(S): at 11:00

## 2021-11-28 NOTE — PROGRESS NOTE ADULT - SUBJECTIVE AND OBJECTIVE BOX
61F with PMHx of HTN, HLD, DM (on metformin) PSHx open hysterectomy, open appendectomy, and  presents with abdominal pain associated with nausea and feculent vomiting x 2 days. On imaging noted to have small bowel obstruction with transition point in RLQ. Pt. is POD # 2 from diagnostic laparoscopy, lysis of adhesions, and repair of enterotomies.      Overnight patient had two episodes of vomiting, unable to tolerate the clears. States she did pass flatus twice, denies any bowel movements. Overall she says she feels better than yesterday. Pain has been well controlled and she has been ambulating.     Vital Signs Last 24 Hrs  T(C): 37.1 (2021 05:26), Max: 37.2 (2021 22:10)  T(F): 98.7 (2021 05:26), Max: 98.9 (2021 22:10)  HR: 91 (2021 05:26) (77 - 93)  BP: 107/68 (2021 05:26) (95/62 - 123/81)  BP(mean): 68 (2021 12:38) (68 - 68)  RR: 17 (2021 05:26) (17 - 18)  SpO2: 97% (2021 05:26) (94% - 100%)    No acute distress, AAOX3   non labored breathing saturating well on room air   abd is soft, slightly tender appropriate post surgical, distended, surgical port sites healing well   full ROM x 4  skin well perfused                           10.6   6.44  )-----------( 149      ( 2021 06:25 )             31.1       138  |  101  |  44<H>  ----------------------------<  134<H>  3.8   |  29  |  1.62<H>    Ca    8.0<L>      2021 06:25  Phos  4.1       Mg     1.5

## 2021-11-29 LAB
ANION GAP SERPL CALC-SCNC: 6 MMOL/L — SIGNIFICANT CHANGE UP (ref 5–17)
BUN SERPL-MCNC: 45 MG/DL — HIGH (ref 7–18)
CALCIUM SERPL-MCNC: 8.5 MG/DL — SIGNIFICANT CHANGE UP (ref 8.4–10.5)
CHLORIDE SERPL-SCNC: 99 MMOL/L — SIGNIFICANT CHANGE UP (ref 96–108)
CO2 SERPL-SCNC: 31 MMOL/L — SIGNIFICANT CHANGE UP (ref 22–31)
CREAT SERPL-MCNC: 1.24 MG/DL — SIGNIFICANT CHANGE UP (ref 0.5–1.3)
GLUCOSE BLDC GLUCOMTR-MCNC: 122 MG/DL — HIGH (ref 70–99)
GLUCOSE BLDC GLUCOMTR-MCNC: 162 MG/DL — HIGH (ref 70–99)
GLUCOSE BLDC GLUCOMTR-MCNC: 175 MG/DL — HIGH (ref 70–99)
GLUCOSE BLDC GLUCOMTR-MCNC: 99 MG/DL — SIGNIFICANT CHANGE UP (ref 70–99)
GLUCOSE SERPL-MCNC: 167 MG/DL — HIGH (ref 70–99)
HCT VFR BLD CALC: 34.3 % — LOW (ref 34.5–45)
HGB BLD-MCNC: 11.9 G/DL — SIGNIFICANT CHANGE UP (ref 11.5–15.5)
MAGNESIUM SERPL-MCNC: 1.9 MG/DL — SIGNIFICANT CHANGE UP (ref 1.6–2.6)
MCHC RBC-ENTMCNC: 31.1 PG — SIGNIFICANT CHANGE UP (ref 27–34)
MCHC RBC-ENTMCNC: 34.7 GM/DL — SIGNIFICANT CHANGE UP (ref 32–36)
MCV RBC AUTO: 89.6 FL — SIGNIFICANT CHANGE UP (ref 80–100)
NRBC # BLD: 0 /100 WBCS — SIGNIFICANT CHANGE UP (ref 0–0)
PHOSPHATE SERPL-MCNC: 3.2 MG/DL — SIGNIFICANT CHANGE UP (ref 2.5–4.5)
PLATELET # BLD AUTO: 165 K/UL — SIGNIFICANT CHANGE UP (ref 150–400)
POTASSIUM SERPL-MCNC: 3.3 MMOL/L — LOW (ref 3.5–5.3)
POTASSIUM SERPL-SCNC: 3.3 MMOL/L — LOW (ref 3.5–5.3)
RBC # BLD: 3.83 M/UL — SIGNIFICANT CHANGE UP (ref 3.8–5.2)
RBC # FLD: 12.9 % — SIGNIFICANT CHANGE UP (ref 10.3–14.5)
SODIUM SERPL-SCNC: 136 MMOL/L — SIGNIFICANT CHANGE UP (ref 135–145)
WBC # BLD: 8.15 K/UL — SIGNIFICANT CHANGE UP (ref 3.8–10.5)
WBC # FLD AUTO: 8.15 K/UL — SIGNIFICANT CHANGE UP (ref 3.8–10.5)

## 2021-11-29 RX ORDER — ENOXAPARIN SODIUM 100 MG/ML
40 INJECTION SUBCUTANEOUS DAILY
Refills: 0 | Status: DISCONTINUED | OUTPATIENT
Start: 2021-11-29 | End: 2021-12-06

## 2021-11-29 RX ORDER — POTASSIUM CHLORIDE 20 MEQ
10 PACKET (EA) ORAL
Refills: 0 | Status: COMPLETED | OUTPATIENT
Start: 2021-11-29 | End: 2021-11-29

## 2021-11-29 RX ADMIN — ONDANSETRON 4 MILLIGRAM(S): 8 TABLET, FILM COATED ORAL at 02:52

## 2021-11-29 RX ADMIN — HYDROMORPHONE HYDROCHLORIDE 0.5 MILLIGRAM(S): 2 INJECTION INTRAMUSCULAR; INTRAVENOUS; SUBCUTANEOUS at 20:00

## 2021-11-29 RX ADMIN — Medication 400 MILLIGRAM(S): at 02:52

## 2021-11-29 RX ADMIN — Medication 5 MILLIGRAM(S): at 17:48

## 2021-11-29 RX ADMIN — HYDROMORPHONE HYDROCHLORIDE 0.5 MILLIGRAM(S): 2 INJECTION INTRAMUSCULAR; INTRAVENOUS; SUBCUTANEOUS at 19:35

## 2021-11-29 RX ADMIN — Medication 100 MILLIEQUIVALENT(S): at 09:15

## 2021-11-29 RX ADMIN — ONDANSETRON 4 MILLIGRAM(S): 8 TABLET, FILM COATED ORAL at 17:25

## 2021-11-29 RX ADMIN — Medication 400 MILLIGRAM(S): at 16:54

## 2021-11-29 RX ADMIN — HYDROMORPHONE HYDROCHLORIDE 0.5 MILLIGRAM(S): 2 INJECTION INTRAMUSCULAR; INTRAVENOUS; SUBCUTANEOUS at 05:38

## 2021-11-29 RX ADMIN — SIMETHICONE 80 MILLIGRAM(S): 80 TABLET, CHEWABLE ORAL at 19:35

## 2021-11-29 RX ADMIN — Medication 1000 MILLIGRAM(S): at 17:10

## 2021-11-29 RX ADMIN — HYDROMORPHONE HYDROCHLORIDE 0.5 MILLIGRAM(S): 2 INJECTION INTRAMUSCULAR; INTRAVENOUS; SUBCUTANEOUS at 06:00

## 2021-11-29 RX ADMIN — Medication 5 MILLIGRAM(S): at 11:29

## 2021-11-29 RX ADMIN — Medication 100 MILLIEQUIVALENT(S): at 11:27

## 2021-11-29 RX ADMIN — ENOXAPARIN SODIUM 40 MILLIGRAM(S): 100 INJECTION SUBCUTANEOUS at 11:28

## 2021-11-29 RX ADMIN — Medication 100 MILLIEQUIVALENT(S): at 14:33

## 2021-11-29 RX ADMIN — SIMETHICONE 80 MILLIGRAM(S): 80 TABLET, CHEWABLE ORAL at 02:59

## 2021-11-29 RX ADMIN — Medication 5 MILLIGRAM(S): at 05:25

## 2021-11-29 RX ADMIN — Medication 1000 MILLIGRAM(S): at 03:20

## 2021-11-29 NOTE — MEDICAL STUDENT PROGRESS NOTE(EDUCATION) - NS MD HP STUD ASPLAN PLAN FT
·	DVT prophylaxis, SCD and lovenox  ·	Ambulation, OOBTC, incentive spirometer  ·	NPO, IVF  ·	Monitor bowel function  ·	Consult cardiology  ·	Continue pain medication

## 2021-11-29 NOTE — PROGRESS NOTE ADULT - TIME BILLING
- Review of records, telemetry, vital signs and daily labs.   - General and cardiovascular physical examination.  - Generation of cardiovascular treatment plan.  - Coordination of care.      Patient was seen and examined by me on 11/29/2021,interim events noted,labs and radiology studies reviewed.  Arcadio Graham MD,FACC.  [address] [place], [place] [id number].  [phone]

## 2021-11-29 NOTE — PROGRESS NOTE ADULT - SUBJECTIVE AND OBJECTIVE BOX
DATE OF SERVICE:    Patient was seen and examined on     .Interim events noted.Consultant notes ,Labs,Telemetry reviewed by me    PRESENTING CC:    HPI and HOSPITAL COURSE: HPI:  61F with PMHx of HTN, HLD, DM (on metformin) PSHx open hysterectomy, open appendectomy, and  presents with abdominal pain associated with nausea and feculent vomiting x 2 days. Pt states she taught it was a stomach virus but symptoms began to worsen over the last todays. Never experienced this in the past. Denied fever, chills, flatus, or bowel function over the last 2 days. (2021 10:30)      INTERIM EVENTS:      PMH -reviewed admission note, no change since admission  Heart Failure: Acute [ ] Chronic [ ] Acute on Chronic [ ] Diastolic [ ] Systolic [ ] Combined Systolic and Diastolic[ ]  CARTER[ ]  ATN[ ]  CKD I [ ] CKDII [ ] CKD III [ ] CKD IV [ ] CKD V [ ] ESRD[ ]  HTN[ ] CVA[ ] DM[ ] COPD[ ] COVID[ ] AF[ ]  PPM[ ] ICD[ ]    MEDICATIONS  (STANDING):  dextrose 40% Gel 15 Gram(s) Oral once  dextrose 5% + sodium chloride 0.45% with potassium chloride 20 mEq/L 1000 milliLiter(s) (100 mL/Hr) IV Continuous <Continuous>  dextrose 5%. 1000 milliLiter(s) (50 mL/Hr) IV Continuous <Continuous>  dextrose 5%. 1000 milliLiter(s) (100 mL/Hr) IV Continuous <Continuous>  dextrose 50% Injectable 25 Gram(s) IV Push once  dextrose 50% Injectable 12.5 Gram(s) IV Push once  dextrose 50% Injectable 25 Gram(s) IV Push once  enoxaparin Injectable 40 milliGRAM(s) SubCutaneous daily  glucagon  Injectable 1 milliGRAM(s) IntraMuscular once  metoprolol tartrate Injectable 5 milliGRAM(s) IV Push every 6 hours  potassium chloride  10 mEq/100 mL IVPB 10 milliEquivalent(s) IV Intermittent every 1 hour    MEDICATIONS  (PRN):  acetaminophen   IVPB .. 1000 milliGRAM(s) IV Intermittent once PRN Moderate Pain (4 - 6)  acetaminophen   IVPB .. 1000 milliGRAM(s) IV Intermittent once PRN Moderate Pain (4 - 6)  HYDROmorphone  Injectable 0.5 milliGRAM(s) IV Push every 3 hours PRN Severe Pain (7 - 10)  ondansetron Injectable 4 milliGRAM(s) IV Push every 6 hours PRN Nausea and/or Vomiting  simethicone 80 milliGRAM(s) Chew every 6 hours PRN Gas  zolpidem 5 milliGRAM(s) Oral at bedtime PRN Insomnia            REVIEW OF SYSTEMS:  Constitutional: [ ] fever, [ ]weight loss,  [ ]fatigue  Eyes: [ ] visual changes  Respiratory: [ ]shortness of breath;  [ ] cough, [ ]wheezing, [ ]chills, [ ]hemoptysis  Cardiovascular: [ ] chest pain, [ ]palpitations, [ ]dizziness,  [ ]leg swelling[ ]orthopnea[ ]PND  Gastrointestinal: [ ] abdominal pain, [ ]nausea, [ ]vomiting,  [ ]diarrhea [ ]Constipation [ ]Melena  Genitourinary: [ ] dysuria, [ ] hematuria [ ][place]  Neurologic: [ ] headaches [ ] tremors[ ]weakness [ ]Paralysis Right[ ] Left[ ]  Skin: [ ] itching, [ ]burning, [ ] rashes  Endocrine: [ ] heat or cold intolerance  Musculoskeletal: [ ] joint pain or swelling; [ ] muscle, back, or extremity pain  Psychiatric: [ ] depression, [ ]anxiety, [ ]mood swings, or [ ]difficulty sleeping  Hematologic: [ ] easy bruising, [ ] bleeding gums    [x] All remaining systems negative except as per above.   [ ]Unable to obtain.    Vital Signs Last 24 Hrs  T(C): 37.1 (2021 05:25), Max: 37.1 (2021 12:58)  T(F): 98.7 (2021 05:25), Max: 98.7 (2021 12:58)  HR: 82 (2021 05:25) (43 - 132)  BP: 139/90 (2021 05:25) (109/81 - 139/90)  BP(mean): --  RR: 17 (2021 05:25) (17 - 18)  SpO2: 98% (2021 05:25) (94% - 98%)  I&O's Summary    2021 07:01  -  2021 07:00  --------------------------------------------------------  IN: 900 mL / OUT: 1550 mL / NET: -650 mL        PHYSICAL EXAM:  General: No acute distress BMI-  HEENT: EOMI, PERRL  Neck: Supple, [ ] JVD  Lungs: Equal air entry bilaterally; [ ] rales [ ] wheezing [ ] rhonchi  Heart: Regular rate and rhythm; [ ] murmur   /6 [ ] systolic [ ] diastolic [ ] radiation[ ] rubs [ ]  gallops  Abdomen: Nontender, bowel sounds present  Extremities: No clubbing, cyanosis, [ ] edema [ ]Pulses  equal and intact  Nervous system:  Alert & Oriented X3, no focal deficits  Psychiatric: Normal affect  Skin: No rashes or lesions    LABS:      136  |  99  |  45<H>  ----------------------------<  167<H>  3.3<L>   |  31  |  1.24    Ca    8.5      2021 06:00  Phos  3.2       Mg     1.9           Creatinine Trend: 1.24<--, 1.62<--, 1.18<--, 1.26<--                        11.9   8.15  )-----------( 165      ( 2021 06:00 )             34.3         Cardiac Enzymes:           RADIOLOGY:    ECG [my interpretation]:    TELEMETRY:Reviewed monitor tracings-    ECHO:    STRESS TEST:    CATHETERIZATION:      IMPRESSION AND PLAN:       DATE OF SERVICE:  2021  Patient was seen and examined on    2021 .Interim events noted.Consultant notes ,Labs,Telemetry reviewed by me    PRESENTING CC:Abdominal pain    HPI and HOSPITAL COURSE: HPI:  61F with PMHx of HTN, HLD, DM (on metformin) PSHx open hysterectomy, open appendectomy, and  presents with abdominal pain associated with nausea and feculent vomiting x 2 days. Pt states she taught it was a stomach virus but symptoms began to worsen over the last todays. Never experienced this in the past. Denied fever, chills, flatus, or bowel function over the last 2 days. (2021 10:30)      INTERIM EVENTS:Awake alert reverted to sinus rhythm      PMH -reviewed admission note, no change since admission  Heart Failure: Acute [ ] Chronic [ ] Acute on Chronic [ ] Diastolic [ ] Systolic [ ] Combined Systolic and Diastolic[ ]  CARTER[ ]  ATN[ ]  CKD I [ ] CKDII [ ] CKD III [ ] CKD IV [ ] CKD V [ ] ESRD[ ]  HTN[ ] CVA[ ] DM[ ] COPD[ ] COVID[ ] AF[ ]  PPM[ ] ICD[ ]    MEDICATIONS  (STANDING):  enoxaparin Injectable 40 milliGRAM(s) SubCutaneous daily  glucagon  Injectable 1 milliGRAM(s) IntraMuscular once  metoprolol tartrate Injectable 5 milliGRAM(s) IV Push every 6 hours  potassium chloride  10 mEq/100 mL IVPB 10 milliEquivalent(s) IV Intermittent every 1 hour    MEDICATIONS  (PRN):  acetaminophen   IVPB .. 1000 milliGRAM(s) IV Intermittent once PRN Moderate Pain (4 - 6)  acetaminophen   IVPB .. 1000 milliGRAM(s) IV Intermittent once PRN Moderate Pain (4 - 6)  HYDROmorphone  Injectable 0.5 milliGRAM(s) IV Push every 3 hours PRN Severe Pain (7 - 10)  ondansetron Injectable 4 milliGRAM(s) IV Push every 6 hours PRN Nausea and/or Vomiting  simethicone 80 milliGRAM(s) Chew every 6 hours PRN Gas  zolpidem 5 milliGRAM(s) Oral at bedtime PRN Insomnia            REVIEW OF SYSTEMS:  Constitutional: [ ] fever, [ ]weight loss,  [ ]fatigue  Eyes: [ ] visual changes  Respiratory: [ ]shortness of breath;  [ ] cough, [ ]wheezing, [ ]chills, [ ]hemoptysis  Cardiovascular: [ ] chest pain, [ ]palpitations, [ ]dizziness,  [ ]leg swelling[ ]orthopnea[ ]PND  Gastrointestinal: [x ] abdominal pain, [ ]nausea, [ ]vomiting,  [ ]diarrhea [ ]Constipation [ ]Melena  Genitourinary: [ ] dysuria, [ ] hematuria [ ][place]  Neurologic: [ ] headaches [ ] tremors[ ]weakness [ ]Paralysis Right[ ] Left[ ]  Skin: [ ] itching, [ ]burning, [ ] rashes  Endocrine: [ ] heat or cold intolerance  Musculoskeletal: [ ] joint pain or swelling; [ ] muscle, back, or extremity pain  Psychiatric: [ ] depression, [ ]anxiety, [ ]mood swings, or [ ]difficulty sleeping  Hematologic: [ ] easy bruising, [ ] bleeding gums    [x] All remaining systems negative except as per above.   [ ]Unable to obtain.    Vital Signs Last 24 Hrs  T(C): 37.1 (2021 05:25), Max: 37.1 (2021 12:58)  T(F): 98.7 (2021 05:25), Max: 98.7 (2021 12:58)  HR: 82 (2021 05:25) (43 - 132)  BP: 139/90 (2021 05:25) (109/81 - 139/90)  RR: 17 (2021 05:25) (17 - 18)  SpO2: 98% (2021 05:25) (94% - 98%)  I&O's Summary    2021 07:01  -  2021 07:00  --------------------------------------------------------  IN: 900 mL / OUT: 1550 mL / NET: -650 mL        PHYSICAL EXAM:  General: No acute distress BMI-28  HEENT: EOMI, PERRL  Neck: Supple, [ ] JVD  Lungs: Equal air entry bilaterally; [ ] rales [ ] wheezing [ ] rhonchi  Heart: Regular rate and rhythm; [x ] murmur   2/6 [x ] systolic [ ] diastolic [ ] radiation[ ] rubs [ ]  gallops  Abdomen: Nontender, bowel sounds present  Extremities: No clubbing, cyanosis, [ ] edema [ ]Pulses  equal and intact  Nervous system:  Alert & Oriented X3, no focal deficits  Psychiatric: Normal affect  Skin: No rashes or lesions    LABS:      136  |  99  |  45<H>  ----------------------------<  167<H>  3.3<L>   |  31  |  1.24    Ca    8.5      2021 06:00  Phos  3.2       Mg     1.9           Creatinine Trend: 1.24<--, 1.62<--, 1.18<--, 1.26<--                        11.9   8.15  )-----------( 165      ( 2021 06:00 )             34.3       ECHO:  Study Date: ONCLUSIONS:  1. Mitral annular calcification. Trace mitral regurgitation.  2. Normal trileafletaortic valve.  3. Aortic Root: 3.5 cm.  4. Normal left atrium.  LA volume index = 22 cc/m2.  5. Normal left ventricular internal dimensions and wall thicknesses.  6. Normal Left Ventricular Systolic Function,  (EF = 55 to 60%)  7. Grade I diastolic dysfunction (Impaired relaxation, mild).  8. Normal right atrium.  9. Normal right ventricular size and function (RV tissue Doppler .10m/s).  10. RV systolic pressure is mildly increased at  44 mm Hg.  11. There is trace tricuspid regurgitation.  12. Pulmonic valve not well seen.  13. Normal pericardium with no pericardial effusion.        IMPRESSION AND PLAN:    Ms. Hathaway 61F with PMHx of HTN, HLD, DM (on metformin) PSHx open hysterectomy, open appendectomy, and  presents with abdominal pain associated with nausea and feculent vomiting x 2 days. On imaging noted to have small bowel obstruction with transition point in RLQ. Pt. is POD # 3 from diagnostic laparoscopy, lysis of adhesions, and repair of enterotomies.        #POD# 3-  Episode Atrial Fibrillation  Reverted to sinus  CHADS2-1  No need for AC  TTE Normal LVEF    #CARTER  Continue IVF  Improving

## 2021-11-29 NOTE — MEDICAL STUDENT PROGRESS NOTE(EDUCATION) - SUBJECTIVE AND OBJECTIVE BOX
Subjective and objective:   Ms. Hathaway 61F with PMHx of HTN, HLD, DM (on metformin) PSHx open hysterectomy, open appendectomy, and  presents with abdominal pain associated with nausea and feculent vomiting x 2 days. On imaging noted to have small bowel obstruction with transition point in RLQ. Pt. is POD # 3 from diagnostic laparoscopy, lysis of adhesions, and repair of enterotomies.      Patient continues to feel better, vomiting three times yesterday and overnight which is greenish in color. Can't tolerate fluids, remain in NPO, developed a. feb yesterday and received cardiology consultation. She did passed gases.    MEDICATIONS  (STANDING):  dextrose 40% Gel 15 Gram(s) Oral once  dextrose 5% + sodium chloride 0.45% with potassium chloride 20 mEq/L 1000 milliLiter(s) (100 mL/Hr) IV Continuous <Continuous>  dextrose 5%. 1000 milliLiter(s) (50 mL/Hr) IV Continuous <Continuous>  dextrose 5%. 1000 milliLiter(s) (100 mL/Hr) IV Continuous <Continuous>  dextrose 50% Injectable 25 Gram(s) IV Push once  dextrose 50% Injectable 12.5 Gram(s) IV Push once  dextrose 50% Injectable 25 Gram(s) IV Push once  enoxaparin Injectable 40 milliGRAM(s) SubCutaneous daily  glucagon  Injectable 1 milliGRAM(s) IntraMuscular once  metoprolol tartrate Injectable 5 milliGRAM(s) IV Push every 6 hours  potassium chloride  10 mEq/100 mL IVPB 10 milliEquivalent(s) IV Intermittent every 1 hour    MEDICATIONS  (PRN):  acetaminophen   IVPB .. 1000 milliGRAM(s) IV Intermittent once PRN Moderate Pain (4 - 6)  acetaminophen   IVPB .. 1000 milliGRAM(s) IV Intermittent once PRN Moderate Pain (4 - 6)  HYDROmorphone  Injectable 0.5 milliGRAM(s) IV Push every 3 hours PRN Severe Pain (7 - 10)  ondansetron Injectable 4 milliGRAM(s) IV Push every 6 hours PRN Nausea and/or Vomiting  simethicone 80 milliGRAM(s) Chew every 6 hours PRN Gas  zolpidem 5 milliGRAM(s) Oral at bedtime PRN Insomnia    Vital Signs Last 24 Hrs  T(C): 36.8 (2021 12:10), Max: 37.1 (2021 12:58)  T(F): 98.3 (2021 12:10), Max: 98.7 (2021 12:58)  HR: 84 (2021 12:10) (82 - 114)  BP: 140/88 (2021 12:10) (109/81 - 140/88)  BP(mean): --  RR: 20 (2021 12:10) (17 - 20)  SpO2: 96% (2021 12:10) (96% - 98%)    Physical exam:  Patient is alert and oriented, Patient is afebrile, non labored breathing, Blood pressure is high this morning ( 139/90 mmHg); no pedal edema   PUL: Lung are clear bilaterally,   CVS: RRR, no g/r/m    CBC:                         11.9   8.15  )-----------( 165      ( 2021 06:00 )             34.3     Chemistry fish bone:   136  |  99  |  45<H>  ----------------------------<  167<H>  3.3<L>   |  31  |  1.24    Ca    8.5      2021 06:00  Phos  3.2       Mg     1.9          Subjective and objective:   Ms. Hathaway 61F with PMHx of HTN, HLD, DM (on metformin) PSHx open hysterectomy, open appendectomy, and  presents with abdominal pain associated with nausea and feculent vomiting x 2 days. On imaging noted to have small bowel obstruction with transition point in RLQ. Pt. is POD # 3 from diagnostic laparoscopy, lysis of adhesions, and repair of enterotomies.      Patient continues to feel better, vomiting three times yesterday and overnight which is greenish in color. Can't tolerate fluids, remain in NPO, developed a. feb yesterday and received cardiology consultation. She did passed gases.    MEDICATIONS  (STANDING):  dextrose 40% Gel 15 Gram(s) Oral once  dextrose 5% + sodium chloride 0.45% with potassium chloride 20 mEq/L 1000 milliLiter(s) (100 mL/Hr) IV Continuous <Continuous>  dextrose 5%. 1000 milliLiter(s) (50 mL/Hr) IV Continuous <Continuous>  dextrose 5%. 1000 milliLiter(s) (100 mL/Hr) IV Continuous <Continuous>  dextrose 50% Injectable 25 Gram(s) IV Push once  dextrose 50% Injectable 12.5 Gram(s) IV Push once  dextrose 50% Injectable 25 Gram(s) IV Push once  enoxaparin Injectable 40 milliGRAM(s) SubCutaneous daily  glucagon  Injectable 1 milliGRAM(s) IntraMuscular once  metoprolol tartrate Injectable 5 milliGRAM(s) IV Push every 6 hours  potassium chloride  10 mEq/100 mL IVPB 10 milliEquivalent(s) IV Intermittent every 1 hour    MEDICATIONS  (PRN):  acetaminophen   IVPB .. 1000 milliGRAM(s) IV Intermittent once PRN Moderate Pain (4 - 6)  acetaminophen   IVPB .. 1000 milliGRAM(s) IV Intermittent once PRN Moderate Pain (4 - 6)  HYDROmorphone  Injectable 0.5 milliGRAM(s) IV Push every 3 hours PRN Severe Pain (7 - 10)  ondansetron Injectable 4 milliGRAM(s) IV Push every 6 hours PRN Nausea and/or Vomiting  simethicone 80 milliGRAM(s) Chew every 6 hours PRN Gas  zolpidem 5 milliGRAM(s) Oral at bedtime PRN Insomnia    Vital Signs Last 24 Hrs  T(C): 36.8 (2021 12:10), Max: 37.1 (2021 12:58)  T(F): 98.3 (2021 12:10), Max: 98.7 (2021 12:58)  HR: 84 (2021 12:10) (82 - 114)  BP: 140/88 (2021 12:10) (109/81 - 140/88)  BP(mean): --  RR: 20 (2021 12:10) (17 - 20)  SpO2: 96% (2021 12:10) (96% - 98%)    Physical exam:  Patient is alert and oriented, Patient is afebrile, non labored breathing, Blood pressure is high this morning ( 139/90 mmHg); no pedal edema   PUL: Lung are clear bilaterally,   CVS: RRR, no g/r/m    CBC:                           11.9   8.15  )-----------( 165      ( 2021 06:00 )             34.3       Chemistry fish bone:   136  |  99  |  45<H>  ----------------------------<  167<H>  3.3<L>   |  31  |  1.24    Ca    8.5      2021 06:00  Phos  3.2       Mg     1.9         Imaging:   CT abdomen:    High-grade small bowel obstruction with abrupt transition point in the right lower quadrant (series 2:109). Associated mesenteric edema, interloop fluid, and small volume ascites.

## 2021-11-29 NOTE — DIETITIAN INITIAL EVALUATION ADULT. - PERTINENT LABORATORY DATA
11-29 Na136 mmol/L Glu 167 mg/dL<H> K+ 3.3 mmol/L<L> Cr  1.24 mg/dL BUN 45 mg/dL<H> 11-29 Phos 3.2 mg/dL 11-26 Alb 3.4 g/dL<L>

## 2021-11-29 NOTE — PROGRESS NOTE ADULT - SUBJECTIVE AND OBJECTIVE BOX
61F with PMHx of HTN, HLD, DM (on metformin) PSHx open hysterectomy, open appendectomy, and  presents with abdominal pain associated with nausea and feculent vomiting x 2 days. On imaging noted to have small bowel obstruction with transition point in RLQ. Pt. is POD # 3 from diagnostic laparoscopy, lysis of adhesions, and repair of enterotomies.      Patient continues to state she feels better. However throughout the day yesterday and overnight, had three episodes of vomiting, brown/greenish in color. Remains NPO at this time, receiving IVF. States she continues to pass gas and had a small liquid bowel movement one time yesterday. Had episode of new onset a.fib yesterday, cardiology consulted, pending recs.      Vital Signs Last 24 Hrs  T(C): 37.1 (2021 05:25), Max: 37.1 (2021 12:58)  T(F): 98.7 (2021 05:25), Max: 98.7 (2021 12:58)  HR: 82 (2021 05:25) (43 - 132)  BP: 139/90 (2021 05:25) (109/81 - 139/90)  BP(mean): --  RR: 17 (2021 05:25) (17 - 18)  SpO2: 98% (2021 05:25) (94% - 98%)      No acute distress, AAOX3   non labored breathing saturating well on room air   abd is soft, TTP in RLQ,  distended, surgical port sites healing well   full ROM x 4  skin well perfused                           11.9   8.15  )-----------( 165      ( 2021 06:00 )             34.3       136  |  99  |  45<H>  ----------------------------<  167<H>  3.3<L>   |  31  |  1.24    Ca    8.5      2021 06:00  Phos  3.2     -  Mg     1.9

## 2021-11-29 NOTE — DIETITIAN INITIAL EVALUATION ADULT. - OTHER INFO
Patient reports losing unintentionally 8% from usual in 2 weeks. (76 TO 69.5kg). will diagnose patient as moderate malnutrition In acute illness in view of low po intake PTA and wt loss. height obtained from patient: 5'8". advance diet per surgical team.

## 2021-11-30 ENCOUNTER — APPOINTMENT (OUTPATIENT)
Dept: SURGICAL ONCOLOGY | Facility: CLINIC | Age: 61
End: 2021-11-30

## 2021-11-30 LAB
ANION GAP SERPL CALC-SCNC: 4 MMOL/L — LOW (ref 5–17)
BUN SERPL-MCNC: 23 MG/DL — HIGH (ref 7–18)
CALCIUM SERPL-MCNC: 7.5 MG/DL — LOW (ref 8.4–10.5)
CHLORIDE SERPL-SCNC: 105 MMOL/L — SIGNIFICANT CHANGE UP (ref 96–108)
CO2 SERPL-SCNC: 28 MMOL/L — SIGNIFICANT CHANGE UP (ref 22–31)
CREAT SERPL-MCNC: 0.64 MG/DL — SIGNIFICANT CHANGE UP (ref 0.5–1.3)
GLUCOSE BLDC GLUCOMTR-MCNC: 131 MG/DL — HIGH (ref 70–99)
GLUCOSE BLDC GLUCOMTR-MCNC: 154 MG/DL — HIGH (ref 70–99)
GLUCOSE SERPL-MCNC: 139 MG/DL — HIGH (ref 70–99)
HCT VFR BLD CALC: 29.3 % — LOW (ref 34.5–45)
HGB BLD-MCNC: 9.8 G/DL — LOW (ref 11.5–15.5)
MAGNESIUM SERPL-MCNC: 1.8 MG/DL — SIGNIFICANT CHANGE UP (ref 1.6–2.6)
MCHC RBC-ENTMCNC: 30.3 PG — SIGNIFICANT CHANGE UP (ref 27–34)
MCHC RBC-ENTMCNC: 33.4 GM/DL — SIGNIFICANT CHANGE UP (ref 32–36)
MCV RBC AUTO: 90.7 FL — SIGNIFICANT CHANGE UP (ref 80–100)
NRBC # BLD: 0 /100 WBCS — SIGNIFICANT CHANGE UP (ref 0–0)
PHOSPHATE SERPL-MCNC: 1.4 MG/DL — LOW (ref 2.5–4.5)
PLATELET # BLD AUTO: 96 K/UL — LOW (ref 150–400)
POTASSIUM SERPL-MCNC: 3.4 MMOL/L — LOW (ref 3.5–5.3)
POTASSIUM SERPL-SCNC: 3.4 MMOL/L — LOW (ref 3.5–5.3)
RBC # BLD: 3.23 M/UL — LOW (ref 3.8–5.2)
RBC # FLD: 12.5 % — SIGNIFICANT CHANGE UP (ref 10.3–14.5)
SODIUM SERPL-SCNC: 137 MMOL/L — SIGNIFICANT CHANGE UP (ref 135–145)
WBC # BLD: 6.63 K/UL — SIGNIFICANT CHANGE UP (ref 3.8–10.5)
WBC # FLD AUTO: 6.63 K/UL — SIGNIFICANT CHANGE UP (ref 3.8–10.5)

## 2021-11-30 RX ORDER — SODIUM CHLORIDE 9 MG/ML
1000 INJECTION INTRAMUSCULAR; INTRAVENOUS; SUBCUTANEOUS
Refills: 0 | Status: DISCONTINUED | OUTPATIENT
Start: 2021-11-30 | End: 2021-12-01

## 2021-11-30 RX ORDER — POTASSIUM PHOSPHATE, MONOBASIC POTASSIUM PHOSPHATE, DIBASIC 236; 224 MG/ML; MG/ML
30 INJECTION, SOLUTION INTRAVENOUS ONCE
Refills: 0 | Status: COMPLETED | OUTPATIENT
Start: 2021-11-30 | End: 2021-11-30

## 2021-11-30 RX ADMIN — Medication 5 MILLIGRAM(S): at 17:30

## 2021-11-30 RX ADMIN — HYDROMORPHONE HYDROCHLORIDE 0.5 MILLIGRAM(S): 2 INJECTION INTRAMUSCULAR; INTRAVENOUS; SUBCUTANEOUS at 16:21

## 2021-11-30 RX ADMIN — POTASSIUM PHOSPHATE, MONOBASIC POTASSIUM PHOSPHATE, DIBASIC 83.33 MILLIMOLE(S): 236; 224 INJECTION, SOLUTION INTRAVENOUS at 09:43

## 2021-11-30 RX ADMIN — SIMETHICONE 80 MILLIGRAM(S): 80 TABLET, CHEWABLE ORAL at 16:22

## 2021-11-30 RX ADMIN — SIMETHICONE 80 MILLIGRAM(S): 80 TABLET, CHEWABLE ORAL at 02:24

## 2021-11-30 RX ADMIN — HYDROMORPHONE HYDROCHLORIDE 0.5 MILLIGRAM(S): 2 INJECTION INTRAMUSCULAR; INTRAVENOUS; SUBCUTANEOUS at 06:05

## 2021-11-30 RX ADMIN — ONDANSETRON 4 MILLIGRAM(S): 8 TABLET, FILM COATED ORAL at 16:21

## 2021-11-30 RX ADMIN — HYDROMORPHONE HYDROCHLORIDE 0.5 MILLIGRAM(S): 2 INJECTION INTRAMUSCULAR; INTRAVENOUS; SUBCUTANEOUS at 22:43

## 2021-11-30 RX ADMIN — ENOXAPARIN SODIUM 40 MILLIGRAM(S): 100 INJECTION SUBCUTANEOUS at 12:35

## 2021-11-30 RX ADMIN — Medication 5 MILLIGRAM(S): at 23:14

## 2021-11-30 RX ADMIN — ONDANSETRON 4 MILLIGRAM(S): 8 TABLET, FILM COATED ORAL at 22:40

## 2021-11-30 RX ADMIN — Medication 5 MILLIGRAM(S): at 12:35

## 2021-11-30 RX ADMIN — HYDROMORPHONE HYDROCHLORIDE 0.5 MILLIGRAM(S): 2 INJECTION INTRAMUSCULAR; INTRAVENOUS; SUBCUTANEOUS at 06:30

## 2021-11-30 RX ADMIN — Medication 5 MILLIGRAM(S): at 05:53

## 2021-11-30 RX ADMIN — ZOLPIDEM TARTRATE 5 MILLIGRAM(S): 10 TABLET ORAL at 00:21

## 2021-11-30 RX ADMIN — SIMETHICONE 80 MILLIGRAM(S): 80 TABLET, CHEWABLE ORAL at 22:44

## 2021-11-30 RX ADMIN — ONDANSETRON 4 MILLIGRAM(S): 8 TABLET, FILM COATED ORAL at 00:16

## 2021-11-30 RX ADMIN — SODIUM CHLORIDE 100 MILLILITER(S): 9 INJECTION INTRAMUSCULAR; INTRAVENOUS; SUBCUTANEOUS at 23:40

## 2021-11-30 RX ADMIN — HYDROMORPHONE HYDROCHLORIDE 0.5 MILLIGRAM(S): 2 INJECTION INTRAMUSCULAR; INTRAVENOUS; SUBCUTANEOUS at 23:05

## 2021-11-30 RX ADMIN — Medication 5 MILLIGRAM(S): at 00:10

## 2021-11-30 NOTE — MEDICAL STUDENT PROGRESS NOTE(EDUCATION) - SUBJECTIVE AND OBJECTIVE BOX
Subjective and objective:     Ms. Hathaway 61F with PMHx of HTN, HLD, DM (on metformin) PSHx open hysterectomy, open appendectomy, and  presents with abdominal pain associated with nausea and feculent vomiting x 2 days. On imaging noted to have small bowel obstruction with transition point in RLQ. Pt. is POD # 3 from diagnostic laparoscopy, lysis of adhesions, and repair of enterotomies.  Today patient is hemodynamically stable, no labored breathing, afebrile, high POCT blood glucose ( 154 mg/dl). She had nutritionist and cardiology consultation.     MEDICATIONS  (STANDING):  dextrose 40% Gel 15 Gram(s) Oral once  dextrose 5% + sodium chloride 0.45% with potassium chloride 20 mEq/L 1000 milliLiter(s) (100 mL/Hr) IV Continuous <Continuous>  dextrose 5%. 1000 milliLiter(s) (50 mL/Hr) IV Continuous <Continuous>  dextrose 5%. 1000 milliLiter(s) (100 mL/Hr) IV Continuous <Continuous>  dextrose 50% Injectable 25 Gram(s) IV Push once  dextrose 50% Injectable 12.5 Gram(s) IV Push once  dextrose 50% Injectable 25 Gram(s) IV Push once  enoxaparin Injectable 40 milliGRAM(s) SubCutaneous daily  glucagon  Injectable 1 milliGRAM(s) IntraMuscular once  metoprolol tartrate Injectable 5 milliGRAM(s) IV Push every 6 hours    MEDICATIONS  (PRN):  acetaminophen   IVPB .. 1000 milliGRAM(s) IV Intermittent once PRN Moderate Pain (4 - 6)  HYDROmorphone  Injectable 0.5 milliGRAM(s) IV Push every 3 hours PRN Severe Pain (7 - 10)  ondansetron Injectable 4 milliGRAM(s) IV Push every 6 hours PRN Nausea and/or Vomiting  simethicone 80 milliGRAM(s) Chew every 6 hours PRN Gas  zolpidem 5 milliGRAM(s) Oral at bedtime PRN Insomnia      Vital Signs Last 24 Hrs  T(C): 37.1 (2021 05:32), Max: 37.7 (2021 21:17)  T(F): 98.7 (2021 05:32), Max: 99.8 (2021 21:17)  HR: 79 (2021 05:32) (78 - 84)  BP: 123/76 (2021 05:32) (123/76 - 140/88)  BP(mean): --  RR: 18 (2021 05:32) (18 - 20)  SpO2: 98% (2021 05:32) (96% - 98%)      Physical exam:   Patient is alert and oriented, Patient is afebrile, non labored breathing, hemodynamically stable, no pedal edema   PUL: Lung are clear bilaterally,   CVS: RRR, no g/r/m  Abd: soft, tender, BS present,       Lab:   CBC:                         11.9   8.15  )-----------( 165      ( 2021 06:00 )             34.3     Chem complete:         136  |  99  |  45<H>  ----------------------------<  167<H>  3.3<L>   |  31  |  1.24    Ca    8.5      2021 06:00  Phos  3.2       Mg     1.9              Subjective and objective:     Ms. Hathaway 61F with PMHx of HTN, HLD, DM (on metformin) PSHx open hysterectomy, open appendectomy, and  presents with abdominal pain associated with nausea and feculent vomiting x 2 days. On imaging noted to have small bowel obstruction with transition point in RLQ. Pt. is POD # 3 from diagnostic laparoscopy, lysis of adhesions, and repair of enterotomies.  Today patient is hemodynamically stable, no labored breathing, afebrile, high POCT blood glucose ( 154 mg/dl). She had nutritionist and cardiology consultation.     MEDICATIONS  (STANDING):  dextrose 40% Gel 15 Gram(s) Oral once  dextrose 5% + sodium chloride 0.45% with potassium chloride 20 mEq/L 1000 milliLiter(s) (100 mL/Hr) IV Continuous <Continuous>  dextrose 5%. 1000 milliLiter(s) (50 mL/Hr) IV Continuous <Continuous>  dextrose 5%. 1000 milliLiter(s) (100 mL/Hr) IV Continuous <Continuous>  dextrose 50% Injectable 25 Gram(s) IV Push once  dextrose 50% Injectable 12.5 Gram(s) IV Push once  dextrose 50% Injectable 25 Gram(s) IV Push once  enoxaparin Injectable 40 milliGRAM(s) SubCutaneous daily  glucagon  Injectable 1 milliGRAM(s) IntraMuscular once  metoprolol tartrate Injectable 5 milliGRAM(s) IV Push every 6 hours    MEDICATIONS  (PRN):  acetaminophen   IVPB .. 1000 milliGRAM(s) IV Intermittent once PRN Moderate Pain (4 - 6)  HYDROmorphone  Injectable 0.5 milliGRAM(s) IV Push every 3 hours PRN Severe Pain (7 - 10)  ondansetron Injectable 4 milliGRAM(s) IV Push every 6 hours PRN Nausea and/or Vomiting  simethicone 80 milliGRAM(s) Chew every 6 hours PRN Gas  zolpidem 5 milliGRAM(s) Oral at bedtime PRN Insomnia      Vital Signs Last 24 Hrs  T(C): 37.1 (2021 05:32), Max: 37.7 (2021 21:17)  T(F): 98.7 (2021 05:32), Max: 99.8 (2021 21:17)  HR: 79 (2021 05:32) (78 - 84)  BP: 123/76 (2021 05:32) (123/76 - 140/88)  BP(mean): --  RR: 18 (2021 05:32) (18 - 20)  SpO2: 98% (2021 05:32) (96% - 98%)      Physical exam:   Patient is alert and oriented, Patient is afebrile, non labored breathing, hemodynamically stable, no pedal edema   PUL: Lung are clear bilaterally,   CVS: RRR, no g/r/m  Abd: soft, tender, BS present,   laparoscopic incisions with clean steristrips in place, abdomen soft, mild tenderness right abdomen, improved abdominal distension   moves all extremities  skin warm and well perfused     Lab:   CBC:                         11.9   8.15  )-----------( 165      ( 2021 06:00 )             34.3     Chem complete:         136  |  99  |  45<H>  ----------------------------<  167<H>  3.3<L>   |  31  |  1.24    Ca    8.5      2021 06:00  Phos  3.2       Mg     1.9

## 2021-11-30 NOTE — MEDICAL STUDENT PROGRESS NOTE(EDUCATION) - NS MD HP STUD ASPLAN PLAN FT
DVT prophylaxis, SCD and lovenox  Ambulation, OOBTC, incentive spirometer  NPO, IVF  Monitor bowel function  Continue pain medication    DVT prophylaxis, SCD and lovenox  Ambulation, OOBTC, incentive spirometer  NPO, IVF  Monitor bowel function  Continue pain medication   electrolyte repletion for low levels; K and phos

## 2021-11-30 NOTE — PROGRESS NOTE ADULT - SUBJECTIVE AND OBJECTIVE BOX
61F with HTN, HLD, DM (on metformin), PSHx open hysterectomy, open appendectomy, and  presents with abdominal pain associated with nausea and feculent vomiting x 2 days. on imaging noted to have small bowel obstruction with transition point in RLQ. patient taken for diagnostic laparoscopy, lysis of adhesions, and repair of enterotomies.      patient doing better today. no nausea or vomiting since yesterday. still continues to pass small amounts of flatus and liquid stool.     Vital Signs Last 24 Hrs  T(C): 37.1 (2021 05:32), Max: 37.7 (2021 21:17)  T(F): 98.7 (2021 05:32), Max: 99.8 (2021 21:17)  HR: 79 (:32) (78 - 84)  BP: 123/76 (2021 05:32) (123/76 - 140/88)  BP(mean): --  RR: 18 (2021 05:32) (18 - 20)  SpO2: 98% (2021 05:32) (96% - 98%)    NAD, awake and alert, converses appropriately, GCS15  unlabored breathing on room air, no accessory muscle use  laparoscopic incisions with clean steristrips in place, abdomen soft, mild tenderness right abdomen, improved abdominal distension   moves all extremities  skin warm and well perfused                           9.8    6.63  )-----------( x        ( 2021 06:19 )             29.3   11-30    137  |  105  |  23<H>  ----------------------------<  139<H>  3.4<L>   |  28  |  0.64    Ca    7.5<L>      2021 06:19  Phos  1.4     11-30  Mg     1.8     11-30      A/P:  61F POD4 diagnostic laparoscopy, adhesiolysis, and repair small bowel enterotomies for small bowel obstruction.     pain and nausea control  appreciate cardiology recs; no need for therapeutic anticoagulation  incentive spirometer  NPO, monitor for bowel function, patient refusing NG tube   SCDs, lovenox for chemical DVT ppx  encourage ambulation and activity 61F with HTN, HLD, DM (on metformin), PSHx open hysterectomy, open appendectomy, and  presents with abdominal pain associated with nausea and feculent vomiting x 2 days. on imaging noted to have small bowel obstruction with transition point in RLQ. patient taken for diagnostic laparoscopy, lysis of adhesions, and repair of enterotomies.      patient doing better today. no nausea or vomiting since yesterday. still continues to pass small amounts of flatus and liquid stool.     Vital Signs Last 24 Hrs  T(C): 37.1 (2021 05:32), Max: 37.7 (2021 21:17)  T(F): 98.7 (2021 05:32), Max: 99.8 (2021 21:17)  HR: 79 (:32) (78 - 84)  BP: 123/76 (2021 05:32) (123/76 - 140/88)  BP(mean): --  RR: 18 (2021 05:32) (18 - 20)  SpO2: 98% (2021 05:32) (96% - 98%)    NAD, awake and alert, converses appropriately, GCS15  unlabored breathing on room air, no accessory muscle use  laparoscopic incisions with clean steristrips in place, abdomen soft, mild tenderness right abdomen, improved abdominal distension   moves all extremities  skin warm and well perfused                           9.8    6.63  )-----------( x        ( 2021 06:19 )             29.3   11-30    137  |  105  |  23<H>  ----------------------------<  139<H>  3.4<L>   |  28  |  0.64    Ca    7.5<L>      2021 06:19  Phos  1.4     11-30  Mg     1.8     11-30      A/P:  61F POD4 diagnostic laparoscopy, adhesiolysis, and repair small bowel enterotomies for small bowel obstruction.     pain and nausea control  appreciate cardiology recs; no need for therapeutic anticoagulation  incentive spirometer  NPO, monitor for bowel function, patient refusing NG tube   electrolyte repletion for low levels; K and phos  SCDs, lovenox for chemical DVT ppx  encourage ambulation and activity

## 2021-12-01 LAB
ANION GAP SERPL CALC-SCNC: 7 MMOL/L — SIGNIFICANT CHANGE UP (ref 5–17)
BUN SERPL-MCNC: 12 MG/DL — SIGNIFICANT CHANGE UP (ref 7–18)
CALCIUM SERPL-MCNC: 8.2 MG/DL — LOW (ref 8.4–10.5)
CHLORIDE SERPL-SCNC: 104 MMOL/L — SIGNIFICANT CHANGE UP (ref 96–108)
CO2 SERPL-SCNC: 27 MMOL/L — SIGNIFICANT CHANGE UP (ref 22–31)
CREAT SERPL-MCNC: 0.61 MG/DL — SIGNIFICANT CHANGE UP (ref 0.5–1.3)
GLUCOSE BLDC GLUCOMTR-MCNC: 106 MG/DL — HIGH (ref 70–99)
GLUCOSE BLDC GLUCOMTR-MCNC: 73 MG/DL — SIGNIFICANT CHANGE UP (ref 70–99)
GLUCOSE BLDC GLUCOMTR-MCNC: 78 MG/DL — SIGNIFICANT CHANGE UP (ref 70–99)
GLUCOSE SERPL-MCNC: 81 MG/DL — SIGNIFICANT CHANGE UP (ref 70–99)
HCT VFR BLD CALC: 34.9 % — SIGNIFICANT CHANGE UP (ref 34.5–45)
HGB BLD-MCNC: 11.6 G/DL — SIGNIFICANT CHANGE UP (ref 11.5–15.5)
MAGNESIUM SERPL-MCNC: 1.8 MG/DL — SIGNIFICANT CHANGE UP (ref 1.6–2.6)
MCHC RBC-ENTMCNC: 30.7 PG — SIGNIFICANT CHANGE UP (ref 27–34)
MCHC RBC-ENTMCNC: 33.2 GM/DL — SIGNIFICANT CHANGE UP (ref 32–36)
MCV RBC AUTO: 92.3 FL — SIGNIFICANT CHANGE UP (ref 80–100)
NRBC # BLD: 0 /100 WBCS — SIGNIFICANT CHANGE UP (ref 0–0)
PHOSPHATE SERPL-MCNC: 2.6 MG/DL — SIGNIFICANT CHANGE UP (ref 2.5–4.5)
PLATELET # BLD AUTO: 173 K/UL — SIGNIFICANT CHANGE UP (ref 150–400)
POTASSIUM SERPL-MCNC: 3.6 MMOL/L — SIGNIFICANT CHANGE UP (ref 3.5–5.3)
POTASSIUM SERPL-SCNC: 3.6 MMOL/L — SIGNIFICANT CHANGE UP (ref 3.5–5.3)
RBC # BLD: 3.78 M/UL — LOW (ref 3.8–5.2)
RBC # FLD: 12.9 % — SIGNIFICANT CHANGE UP (ref 10.3–14.5)
SODIUM SERPL-SCNC: 138 MMOL/L — SIGNIFICANT CHANGE UP (ref 135–145)
WBC # BLD: 9.23 K/UL — SIGNIFICANT CHANGE UP (ref 3.8–10.5)
WBC # FLD AUTO: 9.23 K/UL — SIGNIFICANT CHANGE UP (ref 3.8–10.5)

## 2021-12-01 RX ORDER — ACETAMINOPHEN 500 MG
650 TABLET ORAL EVERY 6 HOURS
Refills: 0 | Status: DISCONTINUED | OUTPATIENT
Start: 2021-12-01 | End: 2021-12-04

## 2021-12-01 RX ORDER — SODIUM,POTASSIUM PHOSPHATES 278-250MG
1 POWDER IN PACKET (EA) ORAL ONCE
Refills: 0 | Status: COMPLETED | OUTPATIENT
Start: 2021-12-01 | End: 2021-12-01

## 2021-12-01 RX ORDER — METOPROLOL TARTRATE 50 MG
25 TABLET ORAL
Refills: 0 | Status: DISCONTINUED | OUTPATIENT
Start: 2021-12-01 | End: 2021-12-03

## 2021-12-01 RX ORDER — HYDROMORPHONE HYDROCHLORIDE 2 MG/ML
1 INJECTION INTRAMUSCULAR; INTRAVENOUS; SUBCUTANEOUS
Refills: 0 | Status: DISCONTINUED | OUTPATIENT
Start: 2021-12-01 | End: 2021-12-01

## 2021-12-01 RX ORDER — DEXTROSE MONOHYDRATE, SODIUM CHLORIDE, AND POTASSIUM CHLORIDE 50; .745; 4.5 G/1000ML; G/1000ML; G/1000ML
1000 INJECTION, SOLUTION INTRAVENOUS
Refills: 0 | Status: DISCONTINUED | OUTPATIENT
Start: 2021-12-01 | End: 2021-12-03

## 2021-12-01 RX ORDER — HYDROMORPHONE HYDROCHLORIDE 2 MG/ML
0.5 INJECTION INTRAMUSCULAR; INTRAVENOUS; SUBCUTANEOUS
Refills: 0 | Status: DISCONTINUED | OUTPATIENT
Start: 2021-12-01 | End: 2021-12-05

## 2021-12-01 RX ORDER — POTASSIUM PHOSPHATE, MONOBASIC POTASSIUM PHOSPHATE, DIBASIC 236; 224 MG/ML; MG/ML
15 INJECTION, SOLUTION INTRAVENOUS ONCE
Refills: 0 | Status: DISCONTINUED | OUTPATIENT
Start: 2021-12-01 | End: 2021-12-01

## 2021-12-01 RX ADMIN — Medication 650 MILLIGRAM(S): at 15:17

## 2021-12-01 RX ADMIN — Medication 5 MILLIGRAM(S): at 05:18

## 2021-12-01 RX ADMIN — Medication 1 PACKET(S): at 18:35

## 2021-12-01 RX ADMIN — HYDROMORPHONE HYDROCHLORIDE 0.5 MILLIGRAM(S): 2 INJECTION INTRAMUSCULAR; INTRAVENOUS; SUBCUTANEOUS at 05:46

## 2021-12-01 RX ADMIN — HYDROMORPHONE HYDROCHLORIDE 0.5 MILLIGRAM(S): 2 INJECTION INTRAMUSCULAR; INTRAVENOUS; SUBCUTANEOUS at 21:45

## 2021-12-01 RX ADMIN — HYDROMORPHONE HYDROCHLORIDE 0.5 MILLIGRAM(S): 2 INJECTION INTRAMUSCULAR; INTRAVENOUS; SUBCUTANEOUS at 21:29

## 2021-12-01 RX ADMIN — HYDROMORPHONE HYDROCHLORIDE 1 MILLIGRAM(S): 2 INJECTION INTRAMUSCULAR; INTRAVENOUS; SUBCUTANEOUS at 21:01

## 2021-12-01 RX ADMIN — ONDANSETRON 4 MILLIGRAM(S): 8 TABLET, FILM COATED ORAL at 21:29

## 2021-12-01 RX ADMIN — HYDROMORPHONE HYDROCHLORIDE 0.5 MILLIGRAM(S): 2 INJECTION INTRAMUSCULAR; INTRAVENOUS; SUBCUTANEOUS at 19:53

## 2021-12-01 RX ADMIN — SIMETHICONE 80 MILLIGRAM(S): 80 TABLET, CHEWABLE ORAL at 14:00

## 2021-12-01 RX ADMIN — Medication 650 MILLIGRAM(S): at 13:59

## 2021-12-01 RX ADMIN — Medication 25 MILLIGRAM(S): at 18:35

## 2021-12-01 RX ADMIN — ENOXAPARIN SODIUM 40 MILLIGRAM(S): 100 INJECTION SUBCUTANEOUS at 14:00

## 2021-12-01 RX ADMIN — HYDROMORPHONE HYDROCHLORIDE 1 MILLIGRAM(S): 2 INJECTION INTRAMUSCULAR; INTRAVENOUS; SUBCUTANEOUS at 20:01

## 2021-12-01 RX ADMIN — HYDROMORPHONE HYDROCHLORIDE 0.5 MILLIGRAM(S): 2 INJECTION INTRAMUSCULAR; INTRAVENOUS; SUBCUTANEOUS at 05:22

## 2021-12-01 NOTE — MEDICAL STUDENT PROGRESS NOTE(EDUCATION) - SUBJECTIVE AND OBJECTIVE BOX
Ms. Ondina Carmichael is a 60 y/o F w/PM/PSHx of open hysterectomy, open appy, & prior  who presented to ED on  d/t abd pain associated w/n & feculent vomiting x 2 days. CT scan revealed SBO w/transition point in RLQ. She had an NG tube placed in w/no improvement so was taken for surgery on same day. She  is currently POD#4, s/p diagnostic laparoscopy for lysis of adhesions & repair of enterotomies d/t SBO. vs wnl, labs show downtrending H&H, K+(3.4), Ca2+(7.5), & Phos (1.4), and elevated BUN (23) but also downtrending.   Pt is showing signs of improving, passing liquid stools and small amounts of flatus. No more n/v    Vital Signs Last 24 Hrs  T(C): 36.6 (01 Dec 2021 05:13), Max: 37.2 (2021 14:56)  T(F): 97.9 (01 Dec 2021 05:13), Max: 98.9 (2021 14:56)  HR: 88 (01 Dec 2021 05:13) (78 - 92)  BP: 146/72 (01 Dec 2021 05:13) (122/87 - 149/78)  BP(mean): --  RR: 17 (01 Dec 2021 05:13) (17 - 18)  SpO2: 95% (01 Dec 2021 05:13) (93% - 96%)    Physical Exam  General: A&Ox3, WDWN  Cardio: RRR, no M/R/G  Res: CTA B/L, no W/R/R  Abd: Laparoscopic incisions clean with steristrips in place; soft, ND, mild TTP in R abd  Skin: Warm and well-perfused  Ext: no pitting edema, FROM, no calf length discrepancy or pain    CBC                        9.8    6.63  )-----------( 96       ( 2021 06:19 )             29.3     Chemistry  11-30    137  |  105  |  23<H>  ----------------------------<  139<H>  3.4<L>   |  28  |  0.64        Ca    7.5<L>      2021 06:19  Phos  1.4     11-30  Mg     1.8

## 2021-12-01 NOTE — MEDICAL STUDENT PROGRESS NOTE(EDUCATION) - NS MD HP STUD ASPLAN PLAN FT
Plan  - Pain + nausea control  - Encourage OOBT  - Monitor electrolytes (replete K+ & Phosphate d/t low levels, also monitor Mg since low Ca2+) & H&H  - NPO, serial abd exam; pt refused NG tube

## 2021-12-01 NOTE — PROGRESS NOTE ADULT - SUBJECTIVE AND OBJECTIVE BOX
61F with HTN, HLD, DM (on metformin), PSHx open hysterectomy, open appendectomy, and  presents with abdominal pain associated with nausea and feculent vomiting x 2 days. on imaging noted to have small bowel obstruction with transition point in RLQ. patient taken for diagnostic laparoscopy, lysis of adhesions, and repair of enterotomies.      Patient states she is feeling okay, was advanced to clears yesterday and tolerated denies any nausea or vomiting. States is passing gas, last bowel movement was yesterday morning. Still complaining of some lower abdomen abdominal pain.     Vital Signs Last 24 Hrs  T(C): 36.6 (01 Dec 2021 05:13), Max: 37.2 (2021 14:56)  T(F): 97.9 (01 Dec 2021 05:13), Max: 98.9 (2021 14:56)  HR: 88 (01 Dec 2021 05:13) (78 - 92)  BP: 146/72 (01 Dec 2021 05:13) (122/87 - 149/78)  BP(mean): --  RR: 17 (01 Dec 2021 05:13) (17 - 18)  SpO2: 95% (01 Dec 2021 05:13) (93% - 96%)    No acute distress, AAOX3  non labored breathing saturating well on room air  abd is distended, slightly tender supraumbilical and RLQ, port incision sites healing well                        9.8    6.63  )-----------( 96       ( 2021 06:19 )             29.3   11-30    137  |  105  |  23<H>  ----------------------------<  139<H>  3.4<L>   |  28  |  0.64    Ca    7.5<L>      2021 06:19  Phos  1.4     11-30  Mg     1.8     30

## 2021-12-02 LAB
ANION GAP SERPL CALC-SCNC: 8 MMOL/L — SIGNIFICANT CHANGE UP (ref 5–17)
BUN SERPL-MCNC: 9 MG/DL — SIGNIFICANT CHANGE UP (ref 7–18)
CALCIUM SERPL-MCNC: 7.5 MG/DL — LOW (ref 8.4–10.5)
CHLORIDE SERPL-SCNC: 107 MMOL/L — SIGNIFICANT CHANGE UP (ref 96–108)
CO2 SERPL-SCNC: 24 MMOL/L — SIGNIFICANT CHANGE UP (ref 22–31)
CREAT SERPL-MCNC: 0.55 MG/DL — SIGNIFICANT CHANGE UP (ref 0.5–1.3)
GLUCOSE BLDC GLUCOMTR-MCNC: 103 MG/DL — HIGH (ref 70–99)
GLUCOSE BLDC GLUCOMTR-MCNC: 115 MG/DL — HIGH (ref 70–99)
GLUCOSE SERPL-MCNC: 131 MG/DL — HIGH (ref 70–99)
HCT VFR BLD CALC: 31 % — LOW (ref 34.5–45)
HGB BLD-MCNC: 10.3 G/DL — LOW (ref 11.5–15.5)
MCHC RBC-ENTMCNC: 30.4 PG — SIGNIFICANT CHANGE UP (ref 27–34)
MCHC RBC-ENTMCNC: 33.2 GM/DL — SIGNIFICANT CHANGE UP (ref 32–36)
MCV RBC AUTO: 91.4 FL — SIGNIFICANT CHANGE UP (ref 80–100)
NRBC # BLD: 0 /100 WBCS — SIGNIFICANT CHANGE UP (ref 0–0)
PLATELET # BLD AUTO: 166 K/UL — SIGNIFICANT CHANGE UP (ref 150–400)
POTASSIUM SERPL-MCNC: 3.7 MMOL/L — SIGNIFICANT CHANGE UP (ref 3.5–5.3)
POTASSIUM SERPL-SCNC: 3.7 MMOL/L — SIGNIFICANT CHANGE UP (ref 3.5–5.3)
RBC # BLD: 3.39 M/UL — LOW (ref 3.8–5.2)
RBC # FLD: 12.5 % — SIGNIFICANT CHANGE UP (ref 10.3–14.5)
SODIUM SERPL-SCNC: 139 MMOL/L — SIGNIFICANT CHANGE UP (ref 135–145)
WBC # BLD: 8.81 K/UL — SIGNIFICANT CHANGE UP (ref 3.8–10.5)
WBC # FLD AUTO: 8.81 K/UL — SIGNIFICANT CHANGE UP (ref 3.8–10.5)

## 2021-12-02 PROCEDURE — 74176 CT ABD & PELVIS W/O CONTRAST: CPT | Mod: 26

## 2021-12-02 RX ORDER — IOHEXOL 300 MG/ML
30 INJECTION, SOLUTION INTRAVENOUS ONCE
Refills: 0 | Status: COMPLETED | OUTPATIENT
Start: 2021-12-02 | End: 2021-12-02

## 2021-12-02 RX ORDER — DIATRIZOATE MEGLUMINE 180 MG/ML
30 INJECTION, SOLUTION INTRAVESICAL ONCE
Refills: 0 | Status: DISCONTINUED | OUTPATIENT
Start: 2021-12-02 | End: 2021-12-02

## 2021-12-02 RX ADMIN — HYDROMORPHONE HYDROCHLORIDE 0.5 MILLIGRAM(S): 2 INJECTION INTRAMUSCULAR; INTRAVENOUS; SUBCUTANEOUS at 21:40

## 2021-12-02 RX ADMIN — HYDROMORPHONE HYDROCHLORIDE 0.5 MILLIGRAM(S): 2 INJECTION INTRAMUSCULAR; INTRAVENOUS; SUBCUTANEOUS at 12:03

## 2021-12-02 RX ADMIN — HYDROMORPHONE HYDROCHLORIDE 0.5 MILLIGRAM(S): 2 INJECTION INTRAMUSCULAR; INTRAVENOUS; SUBCUTANEOUS at 18:10

## 2021-12-02 RX ADMIN — HYDROMORPHONE HYDROCHLORIDE 0.5 MILLIGRAM(S): 2 INJECTION INTRAMUSCULAR; INTRAVENOUS; SUBCUTANEOUS at 21:20

## 2021-12-02 RX ADMIN — Medication 650 MILLIGRAM(S): at 12:04

## 2021-12-02 RX ADMIN — HYDROMORPHONE HYDROCHLORIDE 0.5 MILLIGRAM(S): 2 INJECTION INTRAMUSCULAR; INTRAVENOUS; SUBCUTANEOUS at 14:39

## 2021-12-02 RX ADMIN — ENOXAPARIN SODIUM 40 MILLIGRAM(S): 100 INJECTION SUBCUTANEOUS at 11:11

## 2021-12-02 RX ADMIN — IOHEXOL 30 MILLILITER(S): 300 INJECTION, SOLUTION INTRAVENOUS at 12:03

## 2021-12-02 RX ADMIN — HYDROMORPHONE HYDROCHLORIDE 0.5 MILLIGRAM(S): 2 INJECTION INTRAMUSCULAR; INTRAVENOUS; SUBCUTANEOUS at 05:47

## 2021-12-02 RX ADMIN — SIMETHICONE 80 MILLIGRAM(S): 80 TABLET, CHEWABLE ORAL at 16:36

## 2021-12-02 RX ADMIN — Medication 25 MILLIGRAM(S): at 16:45

## 2021-12-02 RX ADMIN — DEXTROSE MONOHYDRATE, SODIUM CHLORIDE, AND POTASSIUM CHLORIDE 125 MILLILITER(S): 50; .745; 4.5 INJECTION, SOLUTION INTRAVENOUS at 10:20

## 2021-12-02 RX ADMIN — Medication 25 MILLIGRAM(S): at 05:17

## 2021-12-02 RX ADMIN — ONDANSETRON 4 MILLIGRAM(S): 8 TABLET, FILM COATED ORAL at 05:17

## 2021-12-02 RX ADMIN — SIMETHICONE 80 MILLIGRAM(S): 80 TABLET, CHEWABLE ORAL at 10:20

## 2021-12-02 RX ADMIN — Medication 650 MILLIGRAM(S): at 10:00

## 2021-12-02 RX ADMIN — HYDROMORPHONE HYDROCHLORIDE 0.5 MILLIGRAM(S): 2 INJECTION INTRAMUSCULAR; INTRAVENOUS; SUBCUTANEOUS at 05:17

## 2021-12-02 RX ADMIN — ONDANSETRON 4 MILLIGRAM(S): 8 TABLET, FILM COATED ORAL at 17:30

## 2021-12-02 RX ADMIN — HYDROMORPHONE HYDROCHLORIDE 0.5 MILLIGRAM(S): 2 INJECTION INTRAMUSCULAR; INTRAVENOUS; SUBCUTANEOUS at 16:36

## 2021-12-02 RX ADMIN — ONDANSETRON 4 MILLIGRAM(S): 8 TABLET, FILM COATED ORAL at 11:14

## 2021-12-02 NOTE — PROGRESS NOTE ADULT - SUBJECTIVE AND OBJECTIVE BOX
61F with HTN, HLD, DM (on metformin), PSHx open hysterectomy, open appendectomy, and  presents with abdominal pain associated with nausea and feculent vomiting x 2 days. on imaging noted to have small bowel obstruction with transition point in RLQ. patient taken for diagnostic laparoscopy, lysis of adhesions, and repair of enterotomies.      Pt. states she is not feeling well today, yesterday she was advanced to low fiber diet, attempted solid food but vomited about 2 hours after. States is having bowel movements and passing gas, last bowel movement was yesterday and passing gas this morning. However she still feels distended and has some abdominal pain.    Vital Signs Last 24 Hrs  T(C): 37 (02 Dec 2021 05:55), Max: 37 (02 Dec 2021 05:55)  T(F): 98.6 (02 Dec 2021 05:55), Max: 98.6 (02 Dec 2021 05:55)  HR: 75 (02 Dec 2021 05:55) (75 - 91)  BP: 156/90 (02 Dec 2021 05:55) (144/76 - 156/90)  BP(mean): --  RR: 16 (02 Dec 2021 05:55) (16 - 18)  SpO2: 94% (02 Dec 2021 05:55) (93% - 97%)    no acute distress, AAOX3   non labored breathing , saturating well on room air  abd is soft, distended, tender in RLQ and periumbilical area, surgical port sites are healing well  full ROM x 4   skin well perfused

## 2021-12-03 LAB — SARS-COV-2 RNA SPEC QL NAA+PROBE: SIGNIFICANT CHANGE UP

## 2021-12-03 PROCEDURE — 74019 RADEX ABDOMEN 2 VIEWS: CPT | Mod: 26

## 2021-12-03 RX ORDER — AMLODIPINE BESYLATE 2.5 MG/1
10 TABLET ORAL DAILY
Refills: 0 | Status: DISCONTINUED | OUTPATIENT
Start: 2021-12-03 | End: 2021-12-06

## 2021-12-03 RX ORDER — ATORVASTATIN CALCIUM 80 MG/1
20 TABLET, FILM COATED ORAL AT BEDTIME
Refills: 0 | Status: DISCONTINUED | OUTPATIENT
Start: 2021-12-03 | End: 2021-12-06

## 2021-12-03 RX ORDER — ATENOLOL 25 MG/1
100 TABLET ORAL DAILY
Refills: 0 | Status: DISCONTINUED | OUTPATIENT
Start: 2021-12-03 | End: 2021-12-06

## 2021-12-03 RX ADMIN — Medication 650 MILLIGRAM(S): at 23:36

## 2021-12-03 RX ADMIN — ONDANSETRON 4 MILLIGRAM(S): 8 TABLET, FILM COATED ORAL at 12:39

## 2021-12-03 RX ADMIN — HYDROMORPHONE HYDROCHLORIDE 0.5 MILLIGRAM(S): 2 INJECTION INTRAMUSCULAR; INTRAVENOUS; SUBCUTANEOUS at 08:22

## 2021-12-03 RX ADMIN — SIMETHICONE 80 MILLIGRAM(S): 80 TABLET, CHEWABLE ORAL at 06:26

## 2021-12-03 RX ADMIN — HYDROMORPHONE HYDROCHLORIDE 0.5 MILLIGRAM(S): 2 INJECTION INTRAMUSCULAR; INTRAVENOUS; SUBCUTANEOUS at 08:43

## 2021-12-03 RX ADMIN — ATORVASTATIN CALCIUM 20 MILLIGRAM(S): 80 TABLET, FILM COATED ORAL at 21:45

## 2021-12-03 RX ADMIN — HYDROMORPHONE HYDROCHLORIDE 0.5 MILLIGRAM(S): 2 INJECTION INTRAMUSCULAR; INTRAVENOUS; SUBCUTANEOUS at 17:45

## 2021-12-03 RX ADMIN — SIMETHICONE 80 MILLIGRAM(S): 80 TABLET, CHEWABLE ORAL at 18:40

## 2021-12-03 RX ADMIN — Medication 25 MILLIGRAM(S): at 06:26

## 2021-12-03 RX ADMIN — SIMETHICONE 80 MILLIGRAM(S): 80 TABLET, CHEWABLE ORAL at 12:40

## 2021-12-03 RX ADMIN — HYDROMORPHONE HYDROCHLORIDE 0.5 MILLIGRAM(S): 2 INJECTION INTRAMUSCULAR; INTRAVENOUS; SUBCUTANEOUS at 12:39

## 2021-12-03 RX ADMIN — ZOLPIDEM TARTRATE 5 MILLIGRAM(S): 10 TABLET ORAL at 00:11

## 2021-12-03 RX ADMIN — HYDROMORPHONE HYDROCHLORIDE 0.5 MILLIGRAM(S): 2 INJECTION INTRAMUSCULAR; INTRAVENOUS; SUBCUTANEOUS at 17:24

## 2021-12-03 RX ADMIN — ONDANSETRON 4 MILLIGRAM(S): 8 TABLET, FILM COATED ORAL at 06:26

## 2021-12-03 RX ADMIN — HYDROMORPHONE HYDROCHLORIDE 0.5 MILLIGRAM(S): 2 INJECTION INTRAMUSCULAR; INTRAVENOUS; SUBCUTANEOUS at 12:53

## 2021-12-03 RX ADMIN — AMLODIPINE BESYLATE 10 MILLIGRAM(S): 2.5 TABLET ORAL at 17:25

## 2021-12-03 RX ADMIN — HYDROMORPHONE HYDROCHLORIDE 0.5 MILLIGRAM(S): 2 INJECTION INTRAMUSCULAR; INTRAVENOUS; SUBCUTANEOUS at 20:55

## 2021-12-03 RX ADMIN — HYDROMORPHONE HYDROCHLORIDE 0.5 MILLIGRAM(S): 2 INJECTION INTRAMUSCULAR; INTRAVENOUS; SUBCUTANEOUS at 20:35

## 2021-12-03 RX ADMIN — ONDANSETRON 4 MILLIGRAM(S): 8 TABLET, FILM COATED ORAL at 18:39

## 2021-12-03 RX ADMIN — ENOXAPARIN SODIUM 40 MILLIGRAM(S): 100 INJECTION SUBCUTANEOUS at 12:39

## 2021-12-03 NOTE — MEDICAL STUDENT PROGRESS NOTE(EDUCATION) - SUBJECTIVE AND OBJECTIVE BOX
Subjective and objective: Ms. Hathaway 61F with HTN, HLD, DM (on metformin), PSHx open hysterectomy, open appendectomy, and  presents with abdominal pain associated with nausea and feculent vomiting x 2 days. on imaging noted to have small bowel obstruction with transition point in RLQ. Patient taken for diagnostic laparoscopy, lysis of adhesions, and repair of enterotomies.   No acute events overnight, hemodynamically stable. States she feels a lot better today, denies vomiting, but still feeling nauseated tolerating liquids. Passing gas, did not have a bowel movement. Has been ambulating. CT scan obtained yesterday with PO contrast demonstrates dilated loops of SBO.        Medication:   MEDICATIONS  (STANDING):  dextrose 40% Gel 15 Gram(s) Oral once  dextrose 5%. 1000 milliLiter(s) (50 mL/Hr) IV Continuous <Continuous>  dextrose 5%. 1000 milliLiter(s) (100 mL/Hr) IV Continuous <Continuous>  dextrose 50% Injectable 25 Gram(s) IV Push once  dextrose 50% Injectable 12.5 Gram(s) IV Push once  dextrose 50% Injectable 25 Gram(s) IV Push once  enoxaparin Injectable 40 milliGRAM(s) SubCutaneous daily  glucagon  Injectable 1 milliGRAM(s) IntraMuscular once  metoprolol tartrate 25 milliGRAM(s) Oral two times a day    MEDICATIONS  (PRN):  acetaminophen     Tablet .. 650 milliGRAM(s) Oral every 6 hours PRN Mild Pain (1 - 3)  HYDROmorphone  Injectable 0.5 milliGRAM(s) IV Push every 3 hours PRN Severe Pain (7 - 10)  ondansetron Injectable 4 milliGRAM(s) IV Push every 6 hours PRN Nausea and/or Vomiting  simethicone 80 milliGRAM(s) Chew every 6 hours PRN Gas  zolpidem 5 milliGRAM(s) Oral at bedtime PRN Insomnia      Vital Signs Last 24 Hrs  T(C): 36.6 (03 Dec 2021 12:57), Max: 36.7 (03 Dec 2021 05:05)  T(F): 97.9 (03 Dec 2021 12:57), Max: 98.1 (03 Dec 2021 05:05)  HR: 82 (03 Dec 2021 12:57) (74 - 82)  BP: 132/90 (03 Dec 2021 12:57) (118/63 - 146/90)  BP(mean): --  RR: 18 (03 Dec 2021 12:57) (18 - 18)  SpO2: 98% (03 Dec 2021 12:57) (96% - 98%)      Physical exam:   No acute distress, AAOX3, no pedal edema, afebrile,   non labored breathing, saturating well on room air  HEENT: NCAT,  EOMI,   PUL: Lungs cleear bilaterally, no w/r/c  CVS: RRR, no m/g/r  abd distended, TTP supraumbilical and RLQ, incisional port sites healing well  Full ROM x 4, no pedal edema      CBC:                     10.3   8.81  )-----------( 166      ( 02 Dec 2021 06:53 )             31.0   Chemistry complete:         139  |  107  |  9   ----------------------------<  131<H>  3.7   |  24  |  0.55    Ca    7.5<L>      02 Dec 2021 06:53

## 2021-12-03 NOTE — PROGRESS NOTE ADULT - SUBJECTIVE AND OBJECTIVE BOX
61F with HTN, HLD, DM (on metformin), PSHx open hysterectomy, open appendectomy, and  presents with abdominal pain associated with nausea and feculent vomiting x 2 days. on imaging noted to have small bowel obstruction with transition point in RLQ. Patient taken for diagnostic laparoscopy, lysis of adhesions, and repair of enterotomies.    patient seen and examined at the bedside, no acute events overnight, hemodynamically stable. States she feels a lot better today, denies any nausea or vomiting, tolerating the fulls. Passing gas, did not have a bowel movement. Has been ambulating. CT scan obtained yesterday with PO contrast demonstrates dilated loops of SBO     Vital Signs Last 24 Hrs  T(C): 36.7 (03 Dec 2021 05:05), Max: 36.7 (03 Dec 2021 05:05)  T(F): 98.1 (03 Dec 2021 05:05), Max: 98.1 (03 Dec 2021 05:05)  HR: 76 (03 Dec 2021 05:05) (70 - 77)  BP: 118/84 (03 Dec 2021 05:05) (118/63 - 146/90)  BP(mean): --  RR: 18 (03 Dec 2021 05:05) (18 - 18)  SpO2: 97% (03 Dec 2021 05:05) (95% - 97%)      No acute distress, AAOX3   non labored breathing, saturating well on room air  abd distended, TTP supraumbilical and RLQ, incisional port sites healing well  Full ROM x 4

## 2021-12-03 NOTE — MEDICAL STUDENT PROGRESS NOTE(EDUCATION) - NS MD HP STUD ASPLAN PLAN FT
·	pain and nausea control  ·	appreciate cardiology recs; no need for therapeutic anticoagulation  ·	incentive spirometer  ·	Fulls, monitor for bowel function, patient refusing NG tube   ·	SCDs, lovenox for chemical DVT ppx  ·	Patient is ambulating  ·

## 2021-12-04 RX ORDER — ACETAMINOPHEN 500 MG
1000 TABLET ORAL ONCE
Refills: 0 | Status: COMPLETED | OUTPATIENT
Start: 2021-12-04 | End: 2021-12-04

## 2021-12-04 RX ORDER — KETOROLAC TROMETHAMINE 30 MG/ML
15 SYRINGE (ML) INJECTION EVERY 6 HOURS
Refills: 0 | Status: DISCONTINUED | OUTPATIENT
Start: 2021-12-04 | End: 2021-12-05

## 2021-12-04 RX ADMIN — Medication 400 MILLIGRAM(S): at 21:20

## 2021-12-04 RX ADMIN — HYDROMORPHONE HYDROCHLORIDE 0.5 MILLIGRAM(S): 2 INJECTION INTRAMUSCULAR; INTRAVENOUS; SUBCUTANEOUS at 15:16

## 2021-12-04 RX ADMIN — Medication 650 MILLIGRAM(S): at 13:15

## 2021-12-04 RX ADMIN — Medication 15 MILLIGRAM(S): at 21:19

## 2021-12-04 RX ADMIN — Medication 1000 MILLIGRAM(S): at 22:00

## 2021-12-04 RX ADMIN — HYDROMORPHONE HYDROCHLORIDE 0.5 MILLIGRAM(S): 2 INJECTION INTRAMUSCULAR; INTRAVENOUS; SUBCUTANEOUS at 15:08

## 2021-12-04 RX ADMIN — ENOXAPARIN SODIUM 40 MILLIGRAM(S): 100 INJECTION SUBCUTANEOUS at 11:41

## 2021-12-04 RX ADMIN — SIMETHICONE 80 MILLIGRAM(S): 80 TABLET, CHEWABLE ORAL at 13:15

## 2021-12-04 RX ADMIN — Medication 650 MILLIGRAM(S): at 14:10

## 2021-12-04 RX ADMIN — ATORVASTATIN CALCIUM 20 MILLIGRAM(S): 80 TABLET, FILM COATED ORAL at 21:20

## 2021-12-04 RX ADMIN — Medication 650 MILLIGRAM(S): at 00:11

## 2021-12-04 RX ADMIN — AMLODIPINE BESYLATE 10 MILLIGRAM(S): 2.5 TABLET ORAL at 05:40

## 2021-12-04 RX ADMIN — Medication 15 MILLIGRAM(S): at 20:04

## 2021-12-04 RX ADMIN — ATENOLOL 100 MILLIGRAM(S): 25 TABLET ORAL at 05:40

## 2021-12-04 RX ADMIN — ZOLPIDEM TARTRATE 5 MILLIGRAM(S): 10 TABLET ORAL at 21:25

## 2021-12-04 NOTE — PROGRESS NOTE ADULT - SUBJECTIVE AND OBJECTIVE BOX
61F with HTN, HLD, DM (on metformin), PSHx open hysterectomy, open appendectomy, and  presents with abdominal pain associated with nausea and feculent vomiting x 2 days. on imaging noted to have small bowel obstruction with transition point in RLQ. Patient taken for diagnostic laparoscopy, lysis of adhesions, and repair of enterotomies.  patient doing well with no complaints. states she passed a large amount of flatus over the last day and had a solid bowel movement this morning. she would like solid foods.     Vital Signs Last 24 Hrs  T(C): 36.7 (04 Dec 2021 05:30), Max: 36.7 (03 Dec 2021 19:15)  T(F): 98.1 (04 Dec 2021 05:30), Max: 98.1 (03 Dec 2021 19:15)  HR: 90 (04 Dec 2021 05:30) (82 - 90)  BP: 133/85 (04 Dec 2021 05:30) (132/90 - 144/92)  BP(mean): --  RR: 18 (04 Dec 2021 05:30) (18 - 18)  SpO2: 93% (04 Dec 2021 05:30) (93% - 98%)    NAD, awake and alert, converses appropriately, GCS15  unlabored breathing on room air, no accessory muscle use  laparoscopic incisions with skin glue in place, no sign of infection; abdomen soft, mildly tender with deep palpation, mildly distended  moves all extremities  skin warm and well perfused       61F POD8 diagnostic laparoscopy, adhesiolysis, and repair small bowel enterotomies for small bowel obstruction.     pain and nausea control  appreciate cardiology recs; no need for therapeutic anticoagulation  incentive spirometer  continue fulls, monitor for bowel function  SCDs, lovenox for chemical DVT ppx  encourage ambulation and activity   plan for discharge tomorrow if clinical status remains unchanged

## 2021-12-05 ENCOUNTER — TRANSCRIPTION ENCOUNTER (OUTPATIENT)
Age: 61
End: 2021-12-05

## 2021-12-05 RX ORDER — ATENOLOL 25 MG/1
1 TABLET ORAL
Qty: 0 | Refills: 0 | DISCHARGE
Start: 2021-12-05

## 2021-12-05 RX ORDER — ATORVASTATIN CALCIUM 80 MG/1
1 TABLET, FILM COATED ORAL
Qty: 0 | Refills: 0 | DISCHARGE
Start: 2021-12-05

## 2021-12-05 RX ORDER — ACETAMINOPHEN 500 MG
975 TABLET ORAL EVERY 6 HOURS
Refills: 0 | Status: DISCONTINUED | OUTPATIENT
Start: 2021-12-05 | End: 2021-12-06

## 2021-12-05 RX ORDER — IBUPROFEN 200 MG
600 TABLET ORAL EVERY 6 HOURS
Refills: 0 | Status: DISCONTINUED | OUTPATIENT
Start: 2021-12-05 | End: 2021-12-06

## 2021-12-05 RX ORDER — SIMETHICONE 80 MG/1
1 TABLET, CHEWABLE ORAL
Qty: 20 | Refills: 0
Start: 2021-12-05 | End: 2021-12-09

## 2021-12-05 RX ORDER — AMLODIPINE BESYLATE 2.5 MG/1
1 TABLET ORAL
Qty: 0 | Refills: 0 | DISCHARGE
Start: 2021-12-05

## 2021-12-05 RX ORDER — PANTOPRAZOLE SODIUM 20 MG/1
40 TABLET, DELAYED RELEASE ORAL DAILY
Refills: 0 | Status: DISCONTINUED | OUTPATIENT
Start: 2021-12-05 | End: 2021-12-06

## 2021-12-05 RX ADMIN — ATENOLOL 100 MILLIGRAM(S): 25 TABLET ORAL at 05:28

## 2021-12-05 RX ADMIN — ENOXAPARIN SODIUM 40 MILLIGRAM(S): 100 INJECTION SUBCUTANEOUS at 12:16

## 2021-12-05 RX ADMIN — AMLODIPINE BESYLATE 10 MILLIGRAM(S): 2.5 TABLET ORAL at 05:28

## 2021-12-05 RX ADMIN — Medication 15 MILLIGRAM(S): at 05:24

## 2021-12-05 RX ADMIN — Medication 15 MILLIGRAM(S): at 12:52

## 2021-12-05 RX ADMIN — Medication 15 MILLIGRAM(S): at 12:16

## 2021-12-05 RX ADMIN — ATORVASTATIN CALCIUM 20 MILLIGRAM(S): 80 TABLET, FILM COATED ORAL at 21:08

## 2021-12-05 RX ADMIN — Medication 15 MILLIGRAM(S): at 05:53

## 2021-12-05 NOTE — PROGRESS NOTE ADULT - SUBJECTIVE AND OBJECTIVE BOX
61F with HTN, HLD, DM (on metformin), PSHx open hysterectomy, open appendectomy, and  presents with abdominal pain associated with nausea and feculent vomiting x 2 days. on imaging noted to have small bowel obstruction with transition point in RLQ. Patient taken for diagnostic laparoscopy, lysis of adhesions, and repair of enterotomies.  patient doing well but feels worse than yesterday. she had increased pain overnight. still passing flatus and having bowel movements. tolerating fulls with no issues. would still like some solid food.     Vital Signs Last 24 Hrs  T(C): 36.3 (05 Dec 2021 06:18), Max: 36.9 (04 Dec 2021 11:36)  T(F): 97.3 (05 Dec 2021 06:18), Max: 98.4 (04 Dec 2021 11:36)  HR: 82 (05 Dec 2021 06:18) (73 - 82)  BP: 131/75 (05 Dec 2021 06:18) (117/73 - 131/75)  BP(mean): --  RR: 18 (05 Dec 2021 06:18) (18 - 18)  SpO2: 100% (05 Dec 2021 06:18) (96% - 100%)    NAD, awake and alert, converses appropriately, GCS15  unlabored breathing on room air, no accessory muscle use  laparoscopic incisions with skin glue in place, no sign of infection; abdomen soft, nontender, mildly distended but improved from yesterday  moves all extremities  skin warm and well perfused       61F POD9 diagnostic laparoscopy, adhesiolysis, and repair small bowel enterotomies for small bowel obstruction.     pain and nausea control  appreciate cardiology recs; no need for therapeutic anticoagulation  incentive spirometer  continue fulls, monitor bowel function  SCDs, lovenox for chemical DVT ppx  encourage ambulation and activity   plan for discharge tomorrow if clinical status remains unchanged

## 2021-12-05 NOTE — DISCHARGE NOTE PROVIDER - NSDCCPTREATMENT_GEN_ALL_CORE_FT
PRINCIPAL PROCEDURE  Procedure: Lysis, adhesions, intestine, laparoscopic  Findings and Treatment:

## 2021-12-05 NOTE — DISCHARGE NOTE PROVIDER - NSDCMRMEDTOKEN_GEN_ALL_CORE_FT
amLODIPine 10 mg oral tablet: 1 tab(s) orally once a day  atenolol 100 mg oral tablet: 1 tab(s) orally once a day  atorvastatin 20 mg oral tablet: 1 tab(s) orally once a day (at bedtime)  simethicone 80 mg oral tablet, chewable: 1 tab(s) orally every 6 hours, As needed, Gas   acetaminophen 500 mg oral tablet: 2 tab(s) orally every 6 hours, As Needed   amLODIPine 10 mg oral tablet: 1 tab(s) orally once a day  atenolol 100 mg oral tablet: 1 tab(s) orally once a day  atorvastatin 20 mg oral tablet: 1 tab(s) orally once a day (at bedtime)  ibuprofen 600 mg oral tablet: 1 tab(s) orally every 6 hours, As needed, Moderate Pain (4 - 6)  pantoprazole 40 mg oral delayed release tablet: 1 tab(s) orally once a day  simethicone 80 mg oral tablet, chewable: 1 tab(s) orally every 6 hours, As needed, Gas

## 2021-12-05 NOTE — PROGRESS NOTE ADULT - ATTENDING COMMENTS
Had some pain managed w/ IV pain meds. Still having bowel function    abd soft, non-distended, non-tender to palpation    - continue full liquid diet  - avoid IV meds  - possible d/c home tomorrow    Nataliya Rodriguez MD  Attending Physician
Having bowel function and an appetite    abd soft, mildly distended, non-tender to palpation    - advance to LRD  - likely d/c home tomorrow if tolerating diet    Adekemi MD Jennfier  Attending Physician
Afebrile, VSS. +BM and some flatus, but +N/V. Still refusing NGT.   Creatinine improving.     Plan to continue supportive care. Continue IVF
CT shows expected postop findings, likely ileus.  Continue diet - full liquid
C/o N/V, abdomen more distended, passing flatus x 2. She is refusing an NG tube which I recommended.   New onset afib.   Creatinine increased to 1.6    Plan:  Telemetry, cardiology consult  OK to anticoagulate with heparin if needed  NPO, continue IVF

## 2021-12-05 NOTE — DISCHARGE NOTE PROVIDER - NSDCFUADDINST_GEN_ALL_CORE_FT
you may shower normally; no soaking or submerging. leave the skin glue in place; it will come off on its own with normal showering. avoid heavy lifting and straining; your surgical incisions will take time to heal and strengthen. alternate taking acetaminophen and ibuprofen as needed for pain.

## 2021-12-05 NOTE — DISCHARGE NOTE PROVIDER - NSDCQMPCI_CARD_ALL_CORE
No Arthritis    GERD (gastroesophageal reflux disease)    HTN (hypertension)    Hypercholesteremia    Thrombocytopenia

## 2021-12-05 NOTE — DISCHARGE NOTE PROVIDER - HOSPITAL COURSE
patient was admitted for first episode of small bowel obstruction. nasogastric tube was placed with some improvement, but CT showed high-grade small bowel obstruction with transition point in the right lower quadrant; patient was taken for diagnostic laparoscopy with adhesiolysis and small bowel enterotomy repair. she initially had some nausea and vomiting postop, but improved and began having consistent bowel function. diet was advance from clears to fulls and patient was able to tolerate. also immediately postop, patient was noted to have a transient episode of afib; cardiology was consulted with recommendation to continue her home betablocker, but no need for therapeutic anticoagulation. patient was admitted for first episode of small bowel obstruction. nasogastric tube was placed with some improvement, but CT showed high-grade small bowel obstruction with transition point in the right lower quadrant; patient was taken for diagnostic laparoscopy with adhesiolysis and small bowel enterotomy repair. she initially had some nausea and vomiting postop, but improved and began having consistent bowel function. postop day 6 CT showed ileus, with subsequent abdominal XR showing contrast in the colon. diet was advance from clears to fulls and patient was able to tolerate. also immediately postop, patient was noted to have a transient episode of afib; cardiology was consulted with recommendation to continue her home betablocker, but no need for therapeutic anticoagulation. patient was clinically stable at this time; tolerating fulls with bowel function, and all her home meds. she was subsequently discharged with follow up.

## 2021-12-05 NOTE — DISCHARGE NOTE PROVIDER - CARE PROVIDER_API CALL
Gerardo Friend (MD)  Surgery  [address] [place] [address]  Phone: [phone]  Fax: [phone]  Follow Up Time:

## 2021-12-06 ENCOUNTER — TRANSCRIPTION ENCOUNTER (OUTPATIENT)
Age: 61
End: 2021-12-06

## 2021-12-06 VITALS
OXYGEN SATURATION: 97 % | TEMPERATURE: 98 F | HEART RATE: 72 BPM | RESPIRATION RATE: 17 BRPM | DIASTOLIC BLOOD PRESSURE: 73 MMHG | SYSTOLIC BLOOD PRESSURE: 113 MMHG

## 2021-12-06 PROCEDURE — 96374 THER/PROPH/DIAG INJ IV PUSH: CPT

## 2021-12-06 PROCEDURE — 74176 CT ABD & PELVIS W/O CONTRAST: CPT

## 2021-12-06 PROCEDURE — 36415 COLL VENOUS BLD VENIPUNCTURE: CPT

## 2021-12-06 PROCEDURE — 83036 HEMOGLOBIN GLYCOSYLATED A1C: CPT

## 2021-12-06 PROCEDURE — 86803 HEPATITIS C AB TEST: CPT

## 2021-12-06 PROCEDURE — 93306 TTE W/DOPPLER COMPLETE: CPT

## 2021-12-06 PROCEDURE — 93970 EXTREMITY STUDY: CPT

## 2021-12-06 PROCEDURE — 86850 RBC ANTIBODY SCREEN: CPT

## 2021-12-06 PROCEDURE — 80053 COMPREHEN METABOLIC PANEL: CPT

## 2021-12-06 PROCEDURE — 83735 ASSAY OF MAGNESIUM: CPT

## 2021-12-06 PROCEDURE — 86769 SARS-COV-2 COVID-19 ANTIBODY: CPT

## 2021-12-06 PROCEDURE — 71045 X-RAY EXAM CHEST 1 VIEW: CPT

## 2021-12-06 PROCEDURE — 93005 ELECTROCARDIOGRAM TRACING: CPT

## 2021-12-06 PROCEDURE — 87635 SARS-COV-2 COVID-19 AMP PRB: CPT

## 2021-12-06 PROCEDURE — 85025 COMPLETE CBC W/AUTO DIFF WBC: CPT

## 2021-12-06 PROCEDURE — 74019 RADEX ABDOMEN 2 VIEWS: CPT

## 2021-12-06 PROCEDURE — 82962 GLUCOSE BLOOD TEST: CPT

## 2021-12-06 PROCEDURE — 86901 BLOOD TYPING SEROLOGIC RH(D): CPT

## 2021-12-06 PROCEDURE — 80048 BASIC METABOLIC PNL TOTAL CA: CPT

## 2021-12-06 PROCEDURE — 85610 PROTHROMBIN TIME: CPT

## 2021-12-06 PROCEDURE — 85730 THROMBOPLASTIN TIME PARTIAL: CPT

## 2021-12-06 PROCEDURE — 85027 COMPLETE CBC AUTOMATED: CPT

## 2021-12-06 PROCEDURE — 84100 ASSAY OF PHOSPHORUS: CPT

## 2021-12-06 PROCEDURE — 99285 EMERGENCY DEPT VISIT HI MDM: CPT | Mod: 25

## 2021-12-06 PROCEDURE — 86900 BLOOD TYPING SEROLOGIC ABO: CPT

## 2021-12-06 PROCEDURE — 83605 ASSAY OF LACTIC ACID: CPT

## 2021-12-06 PROCEDURE — 74177 CT ABD & PELVIS W/CONTRAST: CPT | Mod: MA

## 2021-12-06 RX ORDER — ACETAMINOPHEN 500 MG
2 TABLET ORAL
Qty: 40 | Refills: 0
Start: 2021-12-06 | End: 2021-12-10

## 2021-12-06 RX ORDER — IBUPROFEN 200 MG
1 TABLET ORAL
Qty: 20 | Refills: 0
Start: 2021-12-06 | End: 2021-12-10

## 2021-12-06 RX ORDER — PANTOPRAZOLE SODIUM 20 MG/1
1 TABLET, DELAYED RELEASE ORAL
Qty: 28 | Refills: 0
Start: 2021-12-06 | End: 2022-01-02

## 2021-12-06 RX ADMIN — PANTOPRAZOLE SODIUM 40 MILLIGRAM(S): 20 TABLET, DELAYED RELEASE ORAL at 11:41

## 2021-12-06 RX ADMIN — ATENOLOL 100 MILLIGRAM(S): 25 TABLET ORAL at 05:49

## 2021-12-06 RX ADMIN — ENOXAPARIN SODIUM 40 MILLIGRAM(S): 100 INJECTION SUBCUTANEOUS at 11:41

## 2021-12-06 RX ADMIN — AMLODIPINE BESYLATE 10 MILLIGRAM(S): 2.5 TABLET ORAL at 05:50

## 2021-12-06 NOTE — PROGRESS NOTE ADULT - REASON FOR ADMISSION
small bowel obstruction

## 2021-12-06 NOTE — PROGRESS NOTE ADULT - ASSESSMENT
61 Y F POD # 2 from diagnostic lap, lysis of adhesions, and repair of enterotomies   -DVT px SCD and lovenox  -ambulation, OOBTC, incentive spirometer   -monitor for bowel function  -hold liquids, NPO, IVF as CARTER likely secondary to dehydration   
61 Y F POD # 3 from diagnostic lap, lysis of adhesions, and repair of enterotomies   -DVT px SCD and lovenox  -ambulation, OOBTC, incentive spirometer   -monitor for bowel function  - NPO, IVF   -pt. refusing NG tube at this time   
61F POD9 diagnostic laparoscopy, adhesiolysis, and repair small bowel enterotomies for small bowel obstruction.     pain and nausea control  appreciate cardiology recs; no need for therapeutic anticoagulation  incentive spirometer  continue fulls, monitor for bowel function  SCDs, lovenox for chemical DVT ppx  encourage ambulation and activity   dispo home today 
61F POD4 diagnostic laparoscopy, adhesiolysis, and repair small bowel enterotomies for small bowel obstruction.     pain and nausea control  appreciate cardiology recs; no need for therapeutic anticoagulation  incentive spirometer  clears, monitor for bowel function, patient refusing NG tube   electrolyte repletion for low levels; K and phos  SCDs, lovenox for chemical DVT ppx  encourage ambulation and activity    
61F POD6 diagnostic laparoscopy, adhesiolysis, and repair small bowel enterotomies for small bowel obstruction.     pain and nausea control  appreciate cardiology recs; no need for therapeutic anticoagulation  incentive spirometer  clears, monitor for bowel function, patient refusing NG tube   SCDs, lovenox for chemical DVT ppx  encourage ambulation and activity  CT scan with PO contrast    
61 y F POD # 1 from diagnostic lap, lysis of adhesions, and repair of enterotomies   -DVT px SCD and lovenox  -ambulation, OOBTC, incentive spirometer   -monitor for bowel function  -continue current pain regimen 
61F POD7 diagnostic laparoscopy, adhesiolysis, and repair small bowel enterotomies for small bowel obstruction.     pain and nausea control  appreciate cardiology recs; no need for therapeutic anticoagulation  incentive spirometer  Fulls, monitor for bowel function, patient refusing NG tube   SCDs, lovenox for chemical DVT ppx  encourage ambulation and activity  KUB to see progression of contrast

## 2021-12-06 NOTE — MEDICAL STUDENT PROGRESS NOTE(EDUCATION) - NS MD HP STUD ASPLAN PLAN FT
·	pain and nausea control  ·	appreciate cardiology recs; no need for therapeutic anticoagulation  ·	incentive spirometer  ·	continue fulls, monitor for bowel function  ·	SCDs, lovenox for chemical DVT ppx  ·	encourage ambulation and activity   ·	dispo home today   ·

## 2021-12-06 NOTE — MEDICAL STUDENT PROGRESS NOTE(EDUCATION) - SUBJECTIVE AND OBJECTIVE BOX
61F with HTN, HLD, DM (on metformin), PSHx open hysterectomy, open appendectomy, and  presents with abdominal pain associated with nausea and feculent vomiting x 2 days. on imaging noted to have small bowel obstruction with transition point in RLQ. Patient taken for diagnostic laparoscopy, lysis of adhesions, and repair of enterotomies.  No acute over night event, pateitn is afebrile, no labored breathing. Patient is doing well with no complaints,  states she passed a large amount of flatus over the last day and had a solid bowel movement yesterday morning. she would like solid foods. Denies any nausea or vomiting.     MEDICATIONS  (STANDING):  amLODIPine   Tablet 10 milliGRAM(s) Oral daily  ATENolol  Tablet 100 milliGRAM(s) Oral daily  atorvastatin 20 milliGRAM(s) Oral at bedtime  dextrose 40% Gel 15 Gram(s) Oral once  dextrose 5%. 1000 milliLiter(s) (50 mL/Hr) IV Continuous <Continuous>  dextrose 5%. 1000 milliLiter(s) (100 mL/Hr) IV Continuous <Continuous>  dextrose 50% Injectable 25 Gram(s) IV Push once  dextrose 50% Injectable 12.5 Gram(s) IV Push once  dextrose 50% Injectable 25 Gram(s) IV Push once  enoxaparin Injectable 40 milliGRAM(s) SubCutaneous daily  glucagon  Injectable 1 milliGRAM(s) IntraMuscular once  pantoprazole    Tablet 40 milliGRAM(s) Oral daily    MEDICATIONS  (PRN):  acetaminophen     Tablet .. 975 milliGRAM(s) Oral every 6 hours PRN Mild Pain (1 - 3)  ibuprofen  Tablet. 600 milliGRAM(s) Oral every 6 hours PRN Moderate Pain (4 - 6)  ondansetron Injectable 4 milliGRAM(s) IV Push every 6 hours PRN Nausea and/or Vomiting  simethicone 80 milliGRAM(s) Chew every 6 hours PRN Gas    Vital Signs Last 24 Hrs  T(C): 36.7 (06 Dec 2021 05:52), Max: 36.9 (05 Dec 2021 12:49)  T(F): 98.1 (06 Dec 2021 05:52), Max: 98.4 (05 Dec 2021 12:49)  HR: 81 (06 Dec 2021 05:52) (77 - 86)  BP: 127/80 (06 Dec 2021 05:52) (114/73 - 128/60)  BP(mean): --  RR: 18 (06 Dec 2021 05:52) (16 - 18)  SpO2: 95% (06 Dec 2021 05:52) (94% - 97%)   Physical exam:  Patient is alert and oriented,  no labored breathing, saturating well on room temperature,   HEENT: EOMI, PERRLA,     PUL: Lungs are clear to ausculation bilaterally, no w/r/c  CVS: RRR, no m/g/r  Abd: Soft, non tender, slight distended, port sites are healing well

## 2021-12-06 NOTE — DISCHARGE NOTE NURSING/CASE MANAGEMENT/SOCIAL WORK - NSDCPEFALRISK_GEN_ALL_CORE
For information on Fall & Injury Prevention, visit: [email or web address] OR  [email or web address] OR  https://www.cdc.gov/steadi/patient.html

## 2021-12-06 NOTE — PROGRESS NOTE ADULT - SUBJECTIVE AND OBJECTIVE BOX
61F with HTN, HLD, DM (on metformin), PSHx open hysterectomy, open appendectomy, and  presents with abdominal pain associated with nausea and feculent vomiting x 2 days. on imaging noted to have small bowel obstruction with transition point in RLQ. Patient taken for diagnostic laparoscopy, lysis of adhesions, and repair of enterotomies.  patient doing well with no complaints. states she passed a large amount of flatus over the last day and had a solid bowel movement yesterday morning. she would like solid foods. Denies any nausea or vomiting.     Vital Signs Last 24 Hrs  T(C): 36.7 (06 Dec 2021 05:52), Max: 36.9 (05 Dec 2021 12:49)  T(F): 98.1 (06 Dec 2021 05:52), Max: 98.4 (05 Dec 2021 12:49)  HR: 81 (06 Dec 2021 05:52) (77 - 86)  BP: 127/80 (06 Dec 2021 05:52) (114/73 - 128/60)  BP(mean): --  RR: 18 (06 Dec 2021 05:52) (16 - 18)  SpO2: 95% (06 Dec 2021 05:52) (94% - 97%)    No acute distress, AAOX3   non labored breathing saturating well on room air   abd is soft, non tender, slightly distended, surgical port sites healing well  full ROM x 4

## 2021-12-06 NOTE — DISCHARGE NOTE NURSING/CASE MANAGEMENT/SOCIAL WORK - PATIENT PORTAL LINK FT
You can access the FollowMyHealth Patient Portal offered by [facility] by registering at the following website: [email or web address]. By joining [employer]’s FollowMyHealth portal, you will also be able to view your health information using other applications (apps) compatible with our system.

## 2021-12-06 NOTE — MEDICAL STUDENT PROGRESS NOTE(EDUCATION) - NS MD HP STUD ASPLAN ASSES FT
61F POD# 10  diagnostic laparoscopy, adhesiolysis, and repair small bowel enterotomies for small bowel obstruction.
s/p diagnostic laparoscopy for lysis of adhesions & repair of enterotomies d/t SBO, POD#4, hemodynamically stable, labs show low K+, Ca2+, Phos, and elevated BUN
61F POD# 7 diagnostic laparoscopy, adhesiolysis, and repair small bowel enterotomies for small bowel obstruction. doing great, no labored breathing, afebrile. High blood glucose ( 131 mg/dl).
Ms. Hathaway 61F is POD # 3 from diagnostic laparoscopy, lysis of adhesions, and repair of enterotomies. No labored breathing, high BP 9139/90 mmHg (pt on Metoprolol IV), low HCt: 34.3
Ms. Hathaway 61F is POD # 4 from diagnostic laparoscopy, lysis of adhesions, and repair of enterotomies. No labored breathing, high blood glucose 154 mg/dl, hemodynamically stable, afebrile

## 2021-12-07 ENCOUNTER — APPOINTMENT (OUTPATIENT)
Dept: SURGICAL ONCOLOGY | Facility: CLINIC | Age: 61
End: 2021-12-07
Payer: MEDICAID

## 2021-12-07 VITALS — TEMPERATURE: 97.4 F

## 2021-12-07 VITALS
DIASTOLIC BLOOD PRESSURE: 72 MMHG | HEIGHT: 68 IN | OXYGEN SATURATION: 99 % | BODY MASS INDEX: 23.49 KG/M2 | SYSTOLIC BLOOD PRESSURE: 108 MMHG | HEART RATE: 73 BPM | WEIGHT: 155 LBS

## 2021-12-07 PROCEDURE — 99205 OFFICE O/P NEW HI 60 MIN: CPT | Mod: 24

## 2021-12-07 NOTE — HISTORY OF PRESENT ILLNESS
[de-identified] : Ondina is a 61 year old female who presents today for an initial consultation. She was referred by Ricardo Lopez MD. \par \par Back Mid Medial Right Shave Biopsy 10/20/21: Melanoma measuring at least 0.9 mm in thickness(the lesion extends focally to the base of the specimen), ulceration absent, mitotic index 0 mm/2, no LVI, at least pT1a.\par \par Back lower right shave biopsy 10/20/21: lentiginous compound melanotocytic nevu\par \par \par \par Referred by: Ricardo Lopez MD.

## 2021-12-07 NOTE — ASSESSMENT
[FreeTextEntry1] : IMP:\par \par 62 y/o female with newly diagnosed melanoma.\par \par Back Mid Medial Right Shave Biopsy 10/20/21: Melanoma measuring at least 0.9 mm in thickness(the lesion extends focally to the base of the specimen), ulceration absent, mitotic index 0 mm/2, no LVI, at least pT1a.\par \par Back lower right shave biopsy 10/20/21: lentiginous compound melanocytic nevus. \par \par Right breast mass\par \par PLAN:\par -Wide excision right medial back melanoma and right lower back melanocytic nevus with nuclear scintography and right axillary sentinel node biopsy\par \par - right breast mammogram and sonogram

## 2021-12-07 NOTE — PHYSICAL EXAM
[Normal] : normal breast inspection and palpation of axillas [Normal Supraclavicular Lymph Nodes] : normal supraclavicular lymph nodes [Normal Axillary Lymph Nodes] : normal axillary lymph nodes [Normal] : oriented to person, place and time, with appropriate affect [de-identified] : 1.5 cm mass at 11:00 right breast [de-identified] : red area around melanoma biopsy site right upper back without any satellite lesions; second smaller area right lower back

## 2021-12-07 NOTE — CONSULT LETTER
[Dear  ___] : Dear  [unfilled], [Consult Letter:] : I had the pleasure of evaluating your patient, [unfilled]. [Please see my note below.] : Please see my note below. [Consult Closing:] : Thank you very much for allowing me to participate in the care of this patient.  If you have any questions, please do not hesitate to contact me. [Sincerely,] : Sincerely, [FreeTextEntry2] : Ricardo Lopez MD\par  [FreeTextEntry1] : I will keep you informed of the final pathology. [FreeTextEntry3] : Angel Johnson MD FACS\par Chief of Surgical Oncology\par \par  [DrStanislaw  ___] : Dr. OLIVARES [DrStanislaw ___] : Dr. OLIVARES

## 2021-12-13 ENCOUNTER — OUTPATIENT (OUTPATIENT)
Dept: OUTPATIENT SERVICES | Facility: HOSPITAL | Age: 61
LOS: 1 days | End: 2021-12-13
Payer: MEDICAID

## 2021-12-13 DIAGNOSIS — Z90.710 ACQUIRED ABSENCE OF BOTH CERVIX AND UTERUS: Chronic | ICD-10-CM

## 2021-12-13 DIAGNOSIS — C80.1 MALIGNANT (PRIMARY) NEOPLASM, UNSPECIFIED: ICD-10-CM

## 2021-12-13 DIAGNOSIS — Z90.49 ACQUIRED ABSENCE OF OTHER SPECIFIED PARTS OF DIGESTIVE TRACT: Chronic | ICD-10-CM

## 2021-12-14 ENCOUNTER — RESULT REVIEW (OUTPATIENT)
Age: 61
End: 2021-12-14

## 2021-12-15 LAB — SURGICAL PATHOLOGY STUDY: SIGNIFICANT CHANGE UP

## 2021-12-20 ENCOUNTER — RESULT REVIEW (OUTPATIENT)
Age: 61
End: 2021-12-20

## 2021-12-20 ENCOUNTER — OUTPATIENT (OUTPATIENT)
Dept: OUTPATIENT SERVICES | Facility: HOSPITAL | Age: 61
LOS: 1 days | End: 2021-12-20
Payer: MEDICAID

## 2021-12-20 VITALS
OXYGEN SATURATION: 99 % | SYSTOLIC BLOOD PRESSURE: 106 MMHG | HEIGHT: 68 IN | DIASTOLIC BLOOD PRESSURE: 73 MMHG | RESPIRATION RATE: 16 BRPM | TEMPERATURE: 99 F | HEART RATE: 73 BPM | WEIGHT: 149.91 LBS

## 2021-12-20 DIAGNOSIS — E78.5 HYPERLIPIDEMIA, UNSPECIFIED: ICD-10-CM

## 2021-12-20 DIAGNOSIS — Z98.891 HISTORY OF UTERINE SCAR FROM PREVIOUS SURGERY: Chronic | ICD-10-CM

## 2021-12-20 DIAGNOSIS — K21.9 GASTRO-ESOPHAGEAL REFLUX DISEASE WITHOUT ESOPHAGITIS: ICD-10-CM

## 2021-12-20 DIAGNOSIS — Z91.89 OTHER SPECIFIED PERSONAL RISK FACTORS, NOT ELSEWHERE CLASSIFIED: ICD-10-CM

## 2021-12-20 DIAGNOSIS — Z90.49 ACQUIRED ABSENCE OF OTHER SPECIFIED PARTS OF DIGESTIVE TRACT: Chronic | ICD-10-CM

## 2021-12-20 DIAGNOSIS — M77.9 ENTHESOPATHY, UNSPECIFIED: Chronic | ICD-10-CM

## 2021-12-20 DIAGNOSIS — C43.59 MALIGNANT MELANOMA OF OTHER PART OF TRUNK: ICD-10-CM

## 2021-12-20 DIAGNOSIS — K66.0 PERITONEAL ADHESIONS (POSTPROCEDURAL) (POSTINFECTION): Chronic | ICD-10-CM

## 2021-12-20 DIAGNOSIS — F41.9 ANXIETY DISORDER, UNSPECIFIED: ICD-10-CM

## 2021-12-20 DIAGNOSIS — Z01.818 ENCOUNTER FOR OTHER PREPROCEDURAL EXAMINATION: ICD-10-CM

## 2021-12-20 DIAGNOSIS — E11.9 TYPE 2 DIABETES MELLITUS WITHOUT COMPLICATIONS: ICD-10-CM

## 2021-12-20 DIAGNOSIS — Z90.710 ACQUIRED ABSENCE OF BOTH CERVIX AND UTERUS: Chronic | ICD-10-CM

## 2021-12-20 DIAGNOSIS — I10 ESSENTIAL (PRIMARY) HYPERTENSION: ICD-10-CM

## 2021-12-20 LAB
ALBUMIN SERPL ELPH-MCNC: 3.1 G/DL — LOW (ref 3.5–5)
ALP SERPL-CCNC: 74 U/L — SIGNIFICANT CHANGE UP (ref 40–120)
ALT FLD-CCNC: 19 U/L DA — SIGNIFICANT CHANGE UP (ref 10–60)
ANION GAP SERPL CALC-SCNC: 6 MMOL/L — SIGNIFICANT CHANGE UP (ref 5–17)
APTT BLD: 31.7 SEC — SIGNIFICANT CHANGE UP (ref 27.5–35.5)
AST SERPL-CCNC: 15 U/L — SIGNIFICANT CHANGE UP (ref 10–40)
BILIRUB SERPL-MCNC: 0.1 MG/DL — LOW (ref 0.2–1.2)
BUN SERPL-MCNC: 29 MG/DL — HIGH (ref 7–18)
CALCIUM SERPL-MCNC: 9.3 MG/DL — SIGNIFICANT CHANGE UP (ref 8.4–10.5)
CHLORIDE SERPL-SCNC: 106 MMOL/L — SIGNIFICANT CHANGE UP (ref 96–108)
CO2 SERPL-SCNC: 28 MMOL/L — SIGNIFICANT CHANGE UP (ref 22–31)
CREAT SERPL-MCNC: 0.65 MG/DL — SIGNIFICANT CHANGE UP (ref 0.5–1.3)
GLUCOSE SERPL-MCNC: 82 MG/DL — SIGNIFICANT CHANGE UP (ref 70–99)
HCT VFR BLD CALC: 30.1 % — LOW (ref 34.5–45)
HGB BLD-MCNC: 10.1 G/DL — LOW (ref 11.5–15.5)
INR BLD: 1.11 RATIO — SIGNIFICANT CHANGE UP (ref 0.88–1.16)
MCHC RBC-ENTMCNC: 29.8 PG — SIGNIFICANT CHANGE UP (ref 27–34)
MCHC RBC-ENTMCNC: 33.6 GM/DL — SIGNIFICANT CHANGE UP (ref 32–36)
MCV RBC AUTO: 88.8 FL — SIGNIFICANT CHANGE UP (ref 80–100)
NRBC # BLD: 0 /100 WBCS — SIGNIFICANT CHANGE UP (ref 0–0)
PLATELET # BLD AUTO: 406 K/UL — HIGH (ref 150–400)
POTASSIUM SERPL-MCNC: 4.1 MMOL/L — SIGNIFICANT CHANGE UP (ref 3.5–5.3)
POTASSIUM SERPL-SCNC: 4.1 MMOL/L — SIGNIFICANT CHANGE UP (ref 3.5–5.3)
PROT SERPL-MCNC: 7.9 G/DL — SIGNIFICANT CHANGE UP (ref 6–8.3)
PROTHROM AB SERPL-ACNC: 13.1 SEC — SIGNIFICANT CHANGE UP (ref 10.6–13.6)
RBC # BLD: 3.39 M/UL — LOW (ref 3.8–5.2)
RBC # FLD: 12 % — SIGNIFICANT CHANGE UP (ref 10.3–14.5)
SODIUM SERPL-SCNC: 140 MMOL/L — SIGNIFICANT CHANGE UP (ref 135–145)
WBC # BLD: 10.21 K/UL — SIGNIFICANT CHANGE UP (ref 3.8–10.5)
WBC # FLD AUTO: 10.21 K/UL — SIGNIFICANT CHANGE UP (ref 3.8–10.5)

## 2021-12-20 PROCEDURE — G0463: CPT

## 2021-12-20 PROCEDURE — 85610 PROTHROMBIN TIME: CPT

## 2021-12-20 PROCEDURE — 83036 HEMOGLOBIN GLYCOSYLATED A1C: CPT

## 2021-12-20 PROCEDURE — 80053 COMPREHEN METABOLIC PANEL: CPT

## 2021-12-20 PROCEDURE — 36415 COLL VENOUS BLD VENIPUNCTURE: CPT

## 2021-12-20 PROCEDURE — 71046 X-RAY EXAM CHEST 2 VIEWS: CPT | Mod: 26

## 2021-12-20 PROCEDURE — 71046 X-RAY EXAM CHEST 2 VIEWS: CPT

## 2021-12-20 PROCEDURE — 85027 COMPLETE CBC AUTOMATED: CPT

## 2021-12-20 PROCEDURE — 85730 THROMBOPLASTIN TIME PARTIAL: CPT

## 2021-12-20 RX ORDER — SODIUM CHLORIDE 9 MG/ML
3 INJECTION INTRAMUSCULAR; INTRAVENOUS; SUBCUTANEOUS EVERY 8 HOURS
Refills: 0 | Status: DISCONTINUED | OUTPATIENT
Start: 2022-01-03 | End: 2022-01-10

## 2021-12-20 NOTE — H&P PST ADULT - PSYCHIATRIC COMMENTS
"I not optimistic about this cancer diagnosis" anxiety disorder controlled on medication - pt is reportedly compliant with prescribed medication

## 2021-12-20 NOTE — H&P PST ADULT - PAIN COMMENT, PROFILE
reports sharp, shooting abdominal pain at lap sites, aggravated by food, movement, relieved with Tylenol

## 2021-12-20 NOTE — H&P PST ADULT - NSICDXPASTSURGICALHX_GEN_ALL_CORE_FT
PAST SURGICAL HISTORY:  H/O abdominal hysterectomy     H/O:  section     History of appendectomy     Intestinal adhesions s/p lysis of adhesions, repair of enterotomies 2021

## 2021-12-20 NOTE — H&P PST ADULT - NEUROLOGICAL COMMENTS
numbness and tingling at RUQ at site of melanoma numbness and tingling at right upper back - site of melanoma

## 2021-12-20 NOTE — H&P PST ADULT - NSICDXFAMILYHX_GEN_ALL_CORE_FT
FAMILY HISTORY:  Father  Still living? No  FH: type 2 diabetes, Age at diagnosis: Age Unknown  FHx: prostate cancer, Age at diagnosis: Age Unknown    Mother  Still living? Unknown  FH: HTN (hypertension), Age at diagnosis: Age Unknown  FH: type 2 diabetes, Age at diagnosis: Age Unknown    Grandparent  Still living? No  FH: HTN (hypertension), Age at diagnosis: Age Unknown  FH: type 2 diabetes, Age at diagnosis: Age Unknown

## 2021-12-20 NOTE — H&P PST ADULT - BREASTS COMMENTS
palpable right breast mass at approximately 11'o clock, NTTP - Dr. Johnson and PCP are aware (biopsy planned post melanoma surgery)

## 2021-12-20 NOTE — H&P PST ADULT - PROBLEM SELECTOR PLAN 5
Take pantoprazole as prescribed with a sip of water the morning of surgery. Follow up with pcp post surgery for management of GERD

## 2021-12-20 NOTE — H&P PST ADULT - ASSESSMENT
61 year old female with PMH of HTN, GERD, HLD, DM (compliant on medication regimen), SBO (11/2021) s/p diagnostic laparoscopy, lysis of adhesions, and repair of enterotomies (11/2021) is now diagnosed with malignant melanoma of other part of trunk 61 year old female with PMH of HTN, GERD, HLD, DM (compliant on medication regimen), right breast mass, SBO (2021) s/p diagnostic laparoscopy, lysis of adhesions, and repair of enterotomies (2021), is now diagnosed with malignant melanoma of other part of trunk. Patient's STOP BANG Score is 2    CAPRINI SCORE [CLOT]    AGE RELATED RISK FACTORS                                                       MOBILITY RELATED FACTORS  [ ] Age 41-60 years                                            (1 Point)                  [ ] Bed rest                                                        (1 Point)  [x ] Age: 61-74 years                                           (2 Points)                 [ ] Plaster cast                                                   (2 Points)  [ ] Age= 75 years                                              (3 Points)                 [ ] Bed bound for more than 72 hours                 (2 Points)    DISEASE RELATED RISK FACTORS                                               GENDER SPECIFIC FACTORS  [ ] Edema in the lower extremities                       (1 Point)                  [ ] Pregnancy                                                     (1 Point)  [ ] Varicose veins                                               (1 Point)                  [ ] Post-partum < 6 weeks                                   (1 Point)             [ ] BMI > 25 Kg/m2                                            (1 Point)                  [ ] Hormonal therapy  or oral contraception          (1 Point)                 [ ] Sepsis (in the previous month)                        (1 Point)                  [ ] History of pregnancy complications                 (1 point)  [ ] Pneumonia or serious lung disease                                               [ ] Unexplained or recurrent                     (1 Point)           (in the previous month)                               (1 Point)  [ ] Abnormal pulmonary function test                     (1 Point)                 SURGERY RELATED RISK FACTORS  [ ] Acute myocardial infarction                              (1 Point)                 [ ]  Section                                             (1 Point)  [ ] Congestive heart failure (in the previous month)  (1 Point)               [ ] Minor surgery                                                  (1 Point)   [ ] Inflammatory bowel disease                             (1 Point)                 [ ] Arthroscopic surgery                                        (2 Points)  [ ] Central venous access                                      (2 Points)                [x ] General surgery lasting more than 45 minutes   (2 Points)       [ ] Stroke (in the previous month)                          (5 Points)               [ ] Elective arthroplasty                                         (5 Points)                                                                                                                                               HEMATOLOGY RELATED FACTORS                                                 TRAUMA RELATED RISK FACTORS  [ ] Prior episodes of VTE                                     (3 Points)                [ ] Fracture of the hip, pelvis, or leg                       (5 Points)  [ ] Positive family history for VTE                         (3 Points)                 [ ] Acute spinal cord injury (in the previous month)  (5 Points)  [ ] Prothrombin 09968 A                                     (3 Points)                 [ ] Paralysis  (less than 1 month)                             (5 Points)  [ ] Factor V Leiden                                             (3 Points)                  [ ] Multiple Trauma within 1 month                        (5 Points)  [ ] Lupus anticoagulants                                     (3 Points)                                                           [ ] Anticardiolipin antibodies                               (3 Points)                                                       [ ] High homocysteine in the blood                      (3 Points)                                             [ ] Other congenital or acquired thrombophilia      (3 Points)                                                [ ] Heparin induced thrombocytopenia                  (3 Points)                                          Total Score [      4    ]    Caprini Score 0 - 2:  Low Risk, No VTE Prophylaxis required for most patients, encourage ambulation  Caprini Score 3 - 6:  At Risk, pharmacologic VTE prophylaxis is indicated for most patients (in the absence of a contraindication)  Caprini Score Greater than or = 7:  High Risk, pharmacologic VTE prophylaxis is indicated for most patients (in the absence of a contraindication)

## 2021-12-20 NOTE — H&P PST ADULT - MS GEN HX ROS MEA POS PC
both hands: ibuprofen prn pain/joint swelling/joint pain/stiffness both hands: ibuprofen prn for pain/joint swelling/joint pain/stiffness

## 2021-12-20 NOTE — H&P PST ADULT - PROBLEM SELECTOR PLAN 3
Patient denies history of MICHELLE  Patient never did sleep study  MICHELLE STOP BANG Score 2  Recommend maintaining perioperative precautions

## 2021-12-20 NOTE — H&P PST ADULT - PROBLEM SELECTOR PLAN 4
Take antihypertensive medications (amlodipine and atenolol as prescribed) with a sip of water the morning of surgery. Follow up with pcp post surgery for management of HTN

## 2021-12-20 NOTE — H&P PST ADULT - PROBLEM SELECTOR PLAN 1
Scheduled for resection of back melanoma and right axillary sentinel lymph node biopsy 01/03/2021  Preoperative instructions discussed and given to patient.  Discussed pre-procedural skin preparation using chlorhexidine gluconate 4% solution/dial soap the morning of surgery prior to coming to hospital.   NPO after midnight.    Instructed to complete Covid 19 test 3 days prior to surgery.   Instructed to stop aspirin and over the counter medication including vitamins and herbal medications one week prior to surgery unless otherwise instructed by your doctor or anesthesia.   Verbal and written instructions given to patient.   Patient verbalized understanding of instructions and is in agreement with the plan of care  Repeat Labs/CXR here today. Pending MC from PCP scheduled for 12/22/2021

## 2021-12-20 NOTE — H&P PST ADULT - PROBLEM SELECTOR PLAN 6
Caprini Score 4:    At Risk, pharmacologic VTE prophylaxis is indicated for most patients (in the absence of a contraindication)

## 2021-12-20 NOTE — H&P PST ADULT - NSICDXPASTMEDICALHX_GEN_ALL_CORE_FT
PAST MEDICAL HISTORY:  Hypertension      PAST MEDICAL HISTORY:  Anxiety     GERD (gastroesophageal reflux disease)     Hypertension     Melanoma     Type 2 diabetes mellitus

## 2021-12-20 NOTE — H&P PST ADULT - BACK EXAM
Chief complaint:   Chief Complaint   Patient presents with   • Back Problem     follow up , left leg and hip pain, review EMG.        History of multiple anterior/posterior L5-S1 decompression and fusion procedures by Dr. Muse  History of left hip fracture status post hemiarthroplasty  History of right below-knee amputation  History of left ankle fracture status post open reduction and internal fixation  L4-5 decompression and fusion DOS 05/01/19  Low back pain  Left thigh pain      Vitals:  Visit Vitals  LMP 08/18/2014       HISTORY OF PRESENT ILLNESS     Carrie returns following L4-5 decompression and fusion on 05/01/19. Her back and thigh symptoms persist without significant interval change. There is difficulty with prolonged standing. There is pain in the left hip while sitting. There is radiation towards the anterior thigh. She denies focal weakness. She has difficulty sitting on a toilet secondary to her left lower extremity pain. Her left lower extremity also gets tight and seizes up on her.     Treatment:  Current medical management: Oxycodone per Dr. Hiro Mccarthy.  Past medical management: N/A  Physical therapy: In the past. Ongoing home physical therapy.  Chiropractic care: In the past.  Spinal injections: In the past.  Other: None.       Other significant problems:  Patient Active Problem List    Diagnosis Date Noted   • S/P lumbar fusion 05/07/2019     Priority: Low   • Pancreatic insufficiency 05/07/2019     Priority: Low   • Diabetic ketoacidosis without coma associated with type 1 diabetes mellitus (CMS/[facility]) 04/04/2018     Priority: Low   • History of hemiarthroplasty of left hip 01/29/2018     Priority: Low   • Pressure ulcer, stage 2 12/08/2017     Priority: Low   • Closed left hip fracture, initial encounter (CMS/[facility]) 10/14/2017     Priority: Low   • Monoclonal gammopathy 08/17/2017     Priority: Low   • Hypothyroidism 08/14/2017     Priority: Low   • Gastroparesis 08/14/2017     Priority: Low    • Neurogenic bladder 08/14/2017     Priority: Low   • Lumbar degenerative disc disease 08/14/2017     Priority: Low   • Abnormal MRI of head 05/25/2017     Priority: Low   • Zinc deficiency 05/25/2017     Priority: Low   • Wound infection complicating hardware (CMS/HCC) 03/07/2017     Priority: Low   • Diabetic nephropathy associated with type 1 diabetes mellitus (CMS/HCC) 11/25/2015     Priority: Low   • Diabetic autonomic neuropathy associated with type 1 diabetes mellitus (CMS/HCC) 11/25/2015     Priority: Low   • Diabetic polyneuropathy (CMS/HCC) 11/25/2015     Priority: Low   • Proliferative diabetic retinopathy without macular edema associated with type 1 diabetes mellitus (CMS/HCC) 11/25/2015     Priority: Low   • Peripheral vascular disease in type 1 diabetes mellitus (CMS/HCC) 11/25/2015     Priority: Low   • Status post below knee amputation of right lower extremity (CMS/HCC) 11/25/2015     Priority: Low   • CKD (chronic kidney disease) stage 3, GFR 30-59 ml/min (CMS/HCC) 02/20/2015     Priority: Low   • Hyperproteinemia 12/12/2014     Priority: Low   • Hypoglycemia unawareness in type 1 diabetes mellitus (CMS/HCC) 04/03/2014     Priority: Low   • Urinary tract infection, site not specified 05/20/2013     Priority: Low   • Sciatica of right side 01/30/2012     Priority: Low   • Phantom limb syndrome with pain (CMS/HCC) 01/30/2012     Priority: Low   • Depression 08/15/2011     Priority: Low   • Unspecified urinary incontinence 08/15/2011     Priority: Low   • Vitamin D deficiency 08/15/2011     Priority: Low       PAST MEDICAL, FAMILY AND SOCIAL HISTORY     Medications:  Current Outpatient Medications   Medication   • oxyCODONE, IMM REL, (ROXICODONE) 15 MG immediate release tablet   • LANTUS SOLOSTAR 100 UNIT/ML pen-injector   • gabapentin (NEURONTIN) 300 MG capsule   • Vitamin D, Ergocalciferol, 77656 units capsule   • buPROPion (WELLBUTRIN XL) 150 MG 24 hr tablet   • BD PEN NEEDLE NENITA U/F 32G X 4 MM  Misc   • venlafaxine XR (EFFEXOR XR) 75 MG 24 hr capsule   • simvastatin (ZOCOR) 20 MG tablet   • estrogens, conjugated (PREMARIN) vaginal cream   • metoPROLOL tartrate (LOPRESSOR) 25 MG tablet   • ONETOUCH VERIO test strip   • acetaminophen (TYLENOL) 325 MG tablet   • insulin glargine (LANTUS SOLOSTAR) 100 UNIT/ML pen-injector   • ferrous sulfate 325 (65 FE) MG tablet   • tiZANidine (ZANAFLEX) 4 MG tablet   • ZINC SULFATE PO   • pentoxifylline (TRENTAL) 400 MG CR tablet   • CREON 04803-46190 units per capsule   • clopidogrel (PLAVIX) 75 MG tablet   • BD PEN NEEDLE NENITA U/F 32G X 4 MM Misc   • insulin lispro (HUMALOG KWIKPEN) 100 UNIT/ML pen-injector   • docusate sodium-sennosides (SENOKOT S) 50-8.6 MG per tablet   • glucagon (GLUCAGON EMERGENCY) 1 MG injection kit   • melatonin 3 MG   • Coenzyme Q10 (COQ10 GUMMIES ADULT) 50 MG Chew Tab   • Multiple Vitamins-Minerals (ALIVE WOMENS GUMMY PO)   • Blood Glucose Monitoring Suppl (ONETOUCH VERIO FLEX SYSTEM) W/DEVICE Kit   • Cranberry 1000 MG CAPS     No current facility-administered medications for this visit.        Allergies:  ALLERGIES:   Allergen Reactions   • Codeine DIZZINESS and NAUSEA   • Levaquin [Levofloxacin Hemihydrate] HIVES   • Sulfa Drugs Cross Reactors VOMITING and NAUSEA   • Tigecycline      Abnormal liver function tests   • Capoten [Captopril]      Lightheaded/hypotension   • Lisinopril Other (See Comments)     hypekalemia   • Pravastatin      myositis   • Dilaudid [Hydromorphone Hcl] HIVES       Past Medical  History/Surgeries:  Past Medical History:   Diagnosis Date   • Anemia    • Arthritis    • Chronic pain    • Congestive cardiac failure (CMS/HCC)    • Depression    • Fractures 02/01/2017    LEFT ANKLE FRACTURE   • Gastroenteritis    • High cholesterol    • History of blood transfusion ~2009   • Hypophosphatemia    • Kidney disease, chronic, stage III (GFR 30-59 ml/min) (CMS/HCC)    • Nephropathy    • Neurogenic bladder    • Neuropathy    • PAD  (peripheral artery disease) (CMS/HCC)    • Pneumonia    • PVD (peripheral vascular disease) (CMS/HCC)    • Retinopathy    • Type I diabetes mellitus (CMS/HCC) 1971    Checks blood sugar at home QID   • Urinary tract infection    • Vitamin D deficiency    • Wears prescription eyeglasses        Past Surgical History:   Procedure Laterality Date   • Ankle fracture surgery Left 2017    ORIF LEFT ANKLE FRACTURE BY DR. SALES AT [facility]   • Appendectomy     • Atherectomy percut,tibioperon     • Back surgery      lumbar fusion   • Back surgery  2013    anterior/posterior fusion   • Balln angioplasty perc,fem-pop     • Balloon angioplasty, artery     • Breast surgery      benign breast biopsyx3   • Carpal tunnel release Right    • Cataract extrac w/ intraocular lens imp&ant vit,bilaterl     • Emergency tracheotomy      due to diabetic coma   • Eye surgery Bilateral     Cataract Extraction with IOL Implants   • Eye surgery Bilateral     Vitrectomy - Left x2 Right x1   • Fracture surgery      le ankle   • Hip surgery Left 10/14/2017    LEFT HIP HEMIARTHROPLASTY @ [facility] BY DR. VINES   • Laparoscopy procedure unlisted      multiple for endometriosis   • Leg amputation Right     Below Knee Amputation   • Lumbar fusion     • Rotator cuff repair Right 2014   • Shoulder debridement Left    • Tubal ligation     • Vascular surgery      angio for legs   • Wrist fracture surgery Left        Family History:  Family History   Problem Relation Age of Onset   • Cancer Father    • Other Brother         essential tremor   • Cataracts Paternal Grandmother    • Stroke Paternal Grandfather        Social History:  Social History     Tobacco Use   • Smoking status: Former Smoker     Packs/day: 1.50     Years: 6.00     Pack years: 9.00     Types: Cigarettes     Last attempt to quit: 1983     Years since quittin.7   • Smokeless tobacco: Never Used   Substance Use Topics   • Alcohol use: No      Alcohol/week: 0.0 standard drinks     Frequency: Never     Drinks per session: 1 or 2     Binge frequency: Never       PHYSICAL EXAM     Physical Exam   Constitutional: She is oriented to person, place, and time. She appears well-developed and well-nourished. No distress.   HENT:   Head: Normocephalic and atraumatic.   Neck: No tracheal deviation present.   Pulmonary/Chest: Effort normal. No respiratory distress. Musculoskeletal:      Lumbar back: She exhibits decreased range of motion and tenderness. She exhibits no bony tenderness and no deformity.     Neurological: She is alert and oriented to person, place, and time. She has normal strength. No sensory deficit. Gait abnormal. Coordination normal.   Reflex Scores:       Patellar reflexes are 2+ on the right side and 2+ on the left side.       Achilles reflexes are 2+ on the right side.  Straight leg raise negative. No clonus. Strength: 5/5 in all muscle groups tested, bilateral lower extremities.   History of right below knee amputation prevents full neurologic examination of the right lower extremity   Skin: Skin is warm and dry. No cyanosis. Nails show no clubbing.   Incision: Healed   Psychiatric: She has a normal mood and affect. Her behavior is normal.       Imaging:  Plain radiographs of the lumbar spine obtained today were reviewed with the patient. There is appropriate hardware placement at L4-5. There appears to be haloing surrounding all the pedicle screws. There is incomplete graft incorporation at L4-5. No anterolisthesis.     Recent plain radiographs of the left hip were reviewed. There is a hemiarthroplasty. No lytic lesions are seen.    Nerve studies:  8/30/2019, Dr. Howard    1) There is electrodiagnostic evidence of a VERY SEVERE, chronic, length-dependent, sensorimotor peripheral polyneuropathy which is primarily axonal in nature.  These findings are consistent with the typical electrical findings seen in a diabetic polyneuropathy.     2) There  is no compelling electrodiagnostic evidence of a superimposed left L2-S1 radiculopathy or lumbosacral plexopathy given that all proximal left lower extremity muscles tested were normal.         ASSESSMENT/PLAN     Assessment:  Solid L5-S1 arthrodesis  s/p L4-5 decompression and fusion with impending pseudarthrosis  History of left hip fracture status post hemiarthroplasty    Discussion:  I discussed the clinical and radiographic findings. Carrie has significant back and left hip pain. Her screws have haloing indicating mild loosening. There is no anterolisthesis. Her recent hip radiographs do not indicate significant abnormality. I recommend that she see a hip specialist for further evaluation. I would like to reevaluate her back in a couple months. Hopefully she will go on to a solid fusion at L4-5.    Plan:  Activity modification  Return in 8 weeks for clinical and radiographic evaluation  Check radiographs of the lumbar spine before being seen: Upright AP and lateral views       Greater than 50% of this time was spent counseling the patient and coordinating care.     normal/normal shape/ROM intact

## 2021-12-20 NOTE — H&P PST ADULT - PROBLEM SELECTOR PLAN 7
Instructed not to take diabetes medication (metformin) the morning of surgery. Follow up with pcp post surgery for management of diabetes

## 2021-12-20 NOTE — H&P PST ADULT - ENDOCRINE COMMENTS
T2DM on metformin (last A1C unknown to patient) T2DM on metformin (last A1C unknown to patient, will obtain level today) - reportedly compliant with metformin

## 2021-12-20 NOTE — H&P PST ADULT - GASTROINTESTINAL COMMENTS
h/o SBO 11/2021: s/p surgery for lysis of adhesions and [facility]otomies [facility]) right upper back melanoma lesion, NTTP

## 2021-12-20 NOTE — H&P PST ADULT - HISTORY OF PRESENT ILLNESS
This is a 61 year old female with PMH of HTN, HLD, DM (on medications), SBO (11/2021) s/p diagnostic laparoscopy, lysis of adhesions, and repair of enterotomies (11/2021) due to recent episodes of abdominal pain associated with nausea and feculent vomiting x 2 days.  Patient is now diagnosed with malignant melanoma of other part of trunk and is scheduled for resection of back melanoma and right axillary sentinel lymph node biopsy 01/03/2021   This is a 61 year old female with PMH of HTN, HLD, DM, GERD, Anxiety (on medications), SBO (11/2021) s/p diagnostic laparoscopy, lysis of adhesions, and repair of enterotomies (11/2021) due to recent episodes of abdominal pain associated with nausea and feculent vomiting x 2 days.  Patient is now diagnosed with malignant melanoma of other part of trunk and is scheduled for resection of back melanoma and right axillary sentinel lymph node biopsy 01/03/2021

## 2021-12-21 LAB
A1C WITH ESTIMATED AVERAGE GLUCOSE RESULT: 6 % — HIGH (ref 4–5.6)
ESTIMATED AVERAGE GLUCOSE: 126 MG/DL — HIGH (ref 68–114)

## 2021-12-26 ENCOUNTER — INPATIENT (INPATIENT)
Facility: HOSPITAL | Age: 61
LOS: 2 days | Discharge: HOME | DRG: 857 | End: 2021-12-29
Attending: SURGERY | Admitting: SURGERY
Payer: MEDICAID

## 2021-12-26 VITALS
HEIGHT: 68 IN | DIASTOLIC BLOOD PRESSURE: 77 MMHG | OXYGEN SATURATION: 99 % | SYSTOLIC BLOOD PRESSURE: 113 MMHG | HEART RATE: 76 BPM | TEMPERATURE: 98 F | WEIGHT: 147.05 LBS | RESPIRATION RATE: 19 BRPM

## 2021-12-26 DIAGNOSIS — Z98.891 HISTORY OF UTERINE SCAR FROM PREVIOUS SURGERY: Chronic | ICD-10-CM

## 2021-12-26 DIAGNOSIS — L02.91 CUTANEOUS ABSCESS, UNSPECIFIED: ICD-10-CM

## 2021-12-26 DIAGNOSIS — Z90.49 ACQUIRED ABSENCE OF OTHER SPECIFIED PARTS OF DIGESTIVE TRACT: Chronic | ICD-10-CM

## 2021-12-26 DIAGNOSIS — Z90.710 ACQUIRED ABSENCE OF BOTH CERVIX AND UTERUS: Chronic | ICD-10-CM

## 2021-12-26 DIAGNOSIS — Z98.890 OTHER SPECIFIED POSTPROCEDURAL STATES: Chronic | ICD-10-CM

## 2021-12-26 DIAGNOSIS — K66.0 PERITONEAL ADHESIONS (POSTPROCEDURAL) (POSTINFECTION): Chronic | ICD-10-CM

## 2021-12-26 LAB
ALBUMIN SERPL ELPH-MCNC: 3.1 G/DL — LOW (ref 3.5–5)
ALP SERPL-CCNC: 68 U/L — SIGNIFICANT CHANGE UP (ref 40–120)
ALT FLD-CCNC: 15 U/L DA — SIGNIFICANT CHANGE UP (ref 10–60)
ANION GAP SERPL CALC-SCNC: 8 MMOL/L — SIGNIFICANT CHANGE UP (ref 5–17)
APPEARANCE UR: CLEAR — SIGNIFICANT CHANGE UP
APTT BLD: 27.6 SEC — SIGNIFICANT CHANGE UP (ref 27.5–35.5)
APTT BLD: 31.8 SEC — SIGNIFICANT CHANGE UP (ref 27.5–35.5)
AST SERPL-CCNC: 12 U/L — SIGNIFICANT CHANGE UP (ref 10–40)
BASOPHILS # BLD AUTO: 0.06 K/UL — SIGNIFICANT CHANGE UP (ref 0–0.2)
BASOPHILS NFR BLD AUTO: 0.3 % — SIGNIFICANT CHANGE UP (ref 0–2)
BILIRUB SERPL-MCNC: 0.5 MG/DL — SIGNIFICANT CHANGE UP (ref 0.2–1.2)
BILIRUB UR-MCNC: NEGATIVE — SIGNIFICANT CHANGE UP
BUN SERPL-MCNC: 13 MG/DL — SIGNIFICANT CHANGE UP (ref 7–18)
CALCIUM SERPL-MCNC: 9 MG/DL — SIGNIFICANT CHANGE UP (ref 8.4–10.5)
CHLORIDE SERPL-SCNC: 102 MMOL/L — SIGNIFICANT CHANGE UP (ref 96–108)
CO2 SERPL-SCNC: 27 MMOL/L — SIGNIFICANT CHANGE UP (ref 22–31)
COLOR SPEC: YELLOW — SIGNIFICANT CHANGE UP
CREAT SERPL-MCNC: 0.63 MG/DL — SIGNIFICANT CHANGE UP (ref 0.5–1.3)
DIFF PNL FLD: NEGATIVE — SIGNIFICANT CHANGE UP
EOSINOPHIL # BLD AUTO: 0.04 K/UL — SIGNIFICANT CHANGE UP (ref 0–0.5)
EOSINOPHIL NFR BLD AUTO: 0.2 % — SIGNIFICANT CHANGE UP (ref 0–6)
GLUCOSE BLDC GLUCOMTR-MCNC: 146 MG/DL — HIGH (ref 70–99)
GLUCOSE SERPL-MCNC: 97 MG/DL — SIGNIFICANT CHANGE UP (ref 70–99)
GLUCOSE UR QL: NEGATIVE — SIGNIFICANT CHANGE UP
HCT VFR BLD CALC: 30.9 % — LOW (ref 34.5–45)
HGB BLD-MCNC: 10.1 G/DL — LOW (ref 11.5–15.5)
IMM GRANULOCYTES NFR BLD AUTO: 0.6 % — SIGNIFICANT CHANGE UP (ref 0–1.5)
INR BLD: 1.25 RATIO — HIGH (ref 0.88–1.16)
INR BLD: 1.3 RATIO — HIGH (ref 0.88–1.16)
KETONES UR-MCNC: NEGATIVE — SIGNIFICANT CHANGE UP
LACTATE SERPL-SCNC: 0.9 MMOL/L — SIGNIFICANT CHANGE UP (ref 0.7–2)
LEUKOCYTE ESTERASE UR-ACNC: NEGATIVE — SIGNIFICANT CHANGE UP
LYMPHOCYTES # BLD AUTO: 10.9 % — LOW (ref 13–44)
LYMPHOCYTES # BLD AUTO: 2.08 K/UL — SIGNIFICANT CHANGE UP (ref 1–3.3)
MCHC RBC-ENTMCNC: 29.1 PG — SIGNIFICANT CHANGE UP (ref 27–34)
MCHC RBC-ENTMCNC: 32.7 GM/DL — SIGNIFICANT CHANGE UP (ref 32–36)
MCV RBC AUTO: 89 FL — SIGNIFICANT CHANGE UP (ref 80–100)
MONOCYTES # BLD AUTO: 1.21 K/UL — HIGH (ref 0–0.9)
MONOCYTES NFR BLD AUTO: 6.3 % — SIGNIFICANT CHANGE UP (ref 2–14)
NEUTROPHILS # BLD AUTO: 15.59 K/UL — HIGH (ref 1.8–7.4)
NEUTROPHILS NFR BLD AUTO: 81.7 % — HIGH (ref 43–77)
NITRITE UR-MCNC: NEGATIVE — SIGNIFICANT CHANGE UP
NRBC # BLD: 0 /100 WBCS — SIGNIFICANT CHANGE UP (ref 0–0)
PH UR: 7 — SIGNIFICANT CHANGE UP (ref 5–8)
PLATELET # BLD AUTO: 350 K/UL — SIGNIFICANT CHANGE UP (ref 150–400)
POTASSIUM SERPL-MCNC: 4.3 MMOL/L — SIGNIFICANT CHANGE UP (ref 3.5–5.3)
POTASSIUM SERPL-SCNC: 4.3 MMOL/L — SIGNIFICANT CHANGE UP (ref 3.5–5.3)
PROT SERPL-MCNC: 8.3 G/DL — SIGNIFICANT CHANGE UP (ref 6–8.3)
PROT UR-MCNC: NEGATIVE — SIGNIFICANT CHANGE UP
PROTHROM AB SERPL-ACNC: 14.7 SEC — HIGH (ref 10.6–13.6)
PROTHROM AB SERPL-ACNC: 15.3 SEC — HIGH (ref 10.6–13.6)
RBC # BLD: 3.47 M/UL — LOW (ref 3.8–5.2)
RBC # FLD: 12.3 % — SIGNIFICANT CHANGE UP (ref 10.3–14.5)
SARS-COV-2 RNA SPEC QL NAA+PROBE: SIGNIFICANT CHANGE UP
SODIUM SERPL-SCNC: 137 MMOL/L — SIGNIFICANT CHANGE UP (ref 135–145)
SP GR SPEC: 1.01 — SIGNIFICANT CHANGE UP (ref 1.01–1.02)
UROBILINOGEN FLD QL: NEGATIVE — SIGNIFICANT CHANGE UP
WBC # BLD: 19.1 K/UL — HIGH (ref 3.8–10.5)
WBC # FLD AUTO: 19.1 K/UL — HIGH (ref 3.8–10.5)

## 2021-12-26 PROCEDURE — 74177 CT ABD & PELVIS W/CONTRAST: CPT | Mod: 26

## 2021-12-26 PROCEDURE — 71045 X-RAY EXAM CHEST 1 VIEW: CPT | Mod: 26

## 2021-12-26 PROCEDURE — 93010 ELECTROCARDIOGRAM REPORT: CPT

## 2021-12-26 PROCEDURE — 99285 EMERGENCY DEPT VISIT HI MDM: CPT

## 2021-12-26 RX ORDER — SODIUM CHLORIDE 9 MG/ML
1000 INJECTION, SOLUTION INTRAVENOUS
Refills: 0 | Status: DISCONTINUED | OUTPATIENT
Start: 2021-12-26 | End: 2021-12-28

## 2021-12-26 RX ORDER — PIPERACILLIN AND TAZOBACTAM 4; .5 G/20ML; G/20ML
3.38 INJECTION, POWDER, LYOPHILIZED, FOR SOLUTION INTRAVENOUS ONCE
Refills: 0 | Status: COMPLETED | OUTPATIENT
Start: 2021-12-26 | End: 2021-12-26

## 2021-12-26 RX ORDER — DEXTROSE 50 % IN WATER 50 %
12.5 SYRINGE (ML) INTRAVENOUS ONCE
Refills: 0 | Status: DISCONTINUED | OUTPATIENT
Start: 2021-12-26 | End: 2021-12-28

## 2021-12-26 RX ORDER — ENOXAPARIN SODIUM 100 MG/ML
40 INJECTION SUBCUTANEOUS DAILY
Refills: 0 | Status: DISCONTINUED | OUTPATIENT
Start: 2021-12-26 | End: 2021-12-29

## 2021-12-26 RX ORDER — DEXTROSE 50 % IN WATER 50 %
15 SYRINGE (ML) INTRAVENOUS ONCE
Refills: 0 | Status: DISCONTINUED | OUTPATIENT
Start: 2021-12-26 | End: 2021-12-28

## 2021-12-26 RX ORDER — ONDANSETRON 8 MG/1
4 TABLET, FILM COATED ORAL EVERY 6 HOURS
Refills: 0 | Status: DISCONTINUED | OUTPATIENT
Start: 2021-12-26 | End: 2021-12-28

## 2021-12-26 RX ORDER — DEXTROSE 50 % IN WATER 50 %
25 SYRINGE (ML) INTRAVENOUS ONCE
Refills: 0 | Status: DISCONTINUED | OUTPATIENT
Start: 2021-12-26 | End: 2021-12-28

## 2021-12-26 RX ORDER — ACETAMINOPHEN 500 MG
1000 TABLET ORAL ONCE
Refills: 0 | Status: COMPLETED | OUTPATIENT
Start: 2021-12-26 | End: 2021-12-26

## 2021-12-26 RX ORDER — HYDROMORPHONE HYDROCHLORIDE 2 MG/ML
0.5 INJECTION INTRAMUSCULAR; INTRAVENOUS; SUBCUTANEOUS EVERY 4 HOURS
Refills: 0 | Status: DISCONTINUED | OUTPATIENT
Start: 2021-12-26 | End: 2021-12-28

## 2021-12-26 RX ORDER — AMPICILLIN SODIUM AND SULBACTAM SODIUM 250; 125 MG/ML; MG/ML
3 INJECTION, POWDER, FOR SUSPENSION INTRAMUSCULAR; INTRAVENOUS EVERY 6 HOURS
Refills: 0 | Status: DISCONTINUED | OUTPATIENT
Start: 2021-12-26 | End: 2021-12-28

## 2021-12-26 RX ORDER — GLUCAGON INJECTION, SOLUTION 0.5 MG/.1ML
1 INJECTION, SOLUTION SUBCUTANEOUS ONCE
Refills: 0 | Status: DISCONTINUED | OUTPATIENT
Start: 2021-12-26 | End: 2021-12-28

## 2021-12-26 RX ORDER — SODIUM CHLORIDE 9 MG/ML
2100 INJECTION INTRAMUSCULAR; INTRAVENOUS; SUBCUTANEOUS ONCE
Refills: 0 | Status: COMPLETED | OUTPATIENT
Start: 2021-12-26 | End: 2021-12-26

## 2021-12-26 RX ORDER — INSULIN LISPRO 100/ML
VIAL (ML) SUBCUTANEOUS EVERY 6 HOURS
Refills: 0 | Status: DISCONTINUED | OUTPATIENT
Start: 2021-12-26 | End: 2021-12-28

## 2021-12-26 RX ADMIN — HYDROMORPHONE HYDROCHLORIDE 0.5 MILLIGRAM(S): 2 INJECTION INTRAMUSCULAR; INTRAVENOUS; SUBCUTANEOUS at 18:57

## 2021-12-26 RX ADMIN — Medication 400 MILLIGRAM(S): at 14:58

## 2021-12-26 RX ADMIN — AMPICILLIN SODIUM AND SULBACTAM SODIUM 200 GRAM(S): 250; 125 INJECTION, POWDER, FOR SUSPENSION INTRAMUSCULAR; INTRAVENOUS at 23:40

## 2021-12-26 RX ADMIN — Medication 1000 MILLIGRAM(S): at 15:30

## 2021-12-26 RX ADMIN — SODIUM CHLORIDE 2100 MILLILITER(S): 9 INJECTION INTRAMUSCULAR; INTRAVENOUS; SUBCUTANEOUS at 14:56

## 2021-12-26 RX ADMIN — HYDROMORPHONE HYDROCHLORIDE 0.5 MILLIGRAM(S): 2 INJECTION INTRAMUSCULAR; INTRAVENOUS; SUBCUTANEOUS at 23:49

## 2021-12-26 RX ADMIN — ENOXAPARIN SODIUM 40 MILLIGRAM(S): 100 INJECTION SUBCUTANEOUS at 23:49

## 2021-12-26 RX ADMIN — PIPERACILLIN AND TAZOBACTAM 200 GRAM(S): 4; .5 INJECTION, POWDER, LYOPHILIZED, FOR SOLUTION INTRAVENOUS at 14:58

## 2021-12-26 NOTE — ED PROVIDER NOTE - OBJECTIVE STATEMENT
60 y/o female, had small bowel obstruction surgery 3 weeks ago, coming in w/ pain, redness, and swelling in left abdominal area in one of port sites. Patient feels subjective fevers and chills. Patient denies any other symptoms. NKDA.

## 2021-12-26 NOTE — ED PROVIDER NOTE - PHYSICAL EXAMINATION
Skin: left abdomen: 8x8 cm area of induration, erythema, and tenderness w/ central pustule at surgical incision site

## 2021-12-26 NOTE — H&P ADULT - NSHPLABSRESULTS_GEN_ALL_CORE
10.1   19.10 )-----------( 350      ( 26 Dec 2021 14:56 )             30.9   12-26    137  |  102  |  13  ----------------------------<  97  4.3   |  27  |  0.63    Ca    9.0      26 Dec 2021 14:56    TPro  8.3  /  Alb  3.1<L>  /  TBili  0.5  /  DBili  x   /  AST  12  /  ALT  15  /  AlkPhos  68  12-26      CT scan 10.1   19.10 )-----------( 350      ( 26 Dec 2021 14:56 )             30.9   12-26    137  |  102  |  13  ----------------------------<  97  4.3   |  27  |  0.63    Ca    9.0      26 Dec 2021 14:56    TPro  8.3  /  Alb  3.1<L>  /  TBili  0.5  /  DBili  x   /  AST  12  /  ALT  15  /  AlkPhos  68  12-26      < from: CT Abdomen and Pelvis w/ IV Cont (12.26.21 @ 16:57) >    FINDINGS:  LOWER CHEST: Clear.    LIVER: Normal.  BILE DUCTS: Nondilated.  GALLBLADDER: Nonspecific wall edema and fundal adenomyomatosis.  SPLEEN: Normal.  PANCREAS: Normal.  ADRENALS: Normal.  KIDNEYS/URETERS: No calculi, hydronephrosis, or renal mass. Bilateral   cortical scarring.    BLADDER: Normal.  REPRODUCTIVE ORGANS: No masses.    BOWEL: Resolution of small bowel obstruction.  PERITONEUM: No free air or ascites.  VESSELS: Normal caliber aorta.  RETROPERITONEUM/LYMPH NODES: No adenopathy.  ABDOMINAL WALL: 6.3 x 3.9 cm left abdominal wall abscess, possibly at a   prior trocar site. The abscess involves the skin, subcutaneous fat,   abdominal wall musculature, and extends at least into the peripheral   peritoneal space. Unclear whether this goes deeper into the abdominal   cavity.  BONES: No acute bony abnormality.    IMPRESSION:  *  6.3 x 3.9 cm left abdominal wall abscess, possibly at a prior trocar   site. The abscess involves the skin,subcutaneous fat, abdominal wall   musculature, and extends at least into the properitoneal space. Unclear   whether this goes deeper into the abdominal cavity, however it is likely   given it is following the trocar tract.    < end of copied text >

## 2021-12-26 NOTE — H&P ADULT - NSHPPHYSICALEXAM_GEN_ALL_CORE
Vital Signs Last 24 Hrs  T(C): 36.7 (26 Dec 2021 11:34), Max: 36.7 (26 Dec 2021 11:34)  T(F): 98 (26 Dec 2021 11:34), Max: 98 (26 Dec 2021 11:34)  HR: 76 (26 Dec 2021 11:34) (76 - 76)  BP: 113/77 (26 Dec 2021 11:34) (113/77 - 113/77)  BP(mean): --  RR: 19 (26 Dec 2021 11:34) (19 - 19)  SpO2: 99% (26 Dec 2021 11:34) (99% - 99%)    General:  A&Ox3,Appears stated age, No acute distress,  Head: NC/AT  EENT: PERRLA. EOMI. Conjunctiva and sclera clear. Pharynx clear.  Neck: Supple. No JVD  Lungs: CTA B/l. Nonlabored Respirations  CV: +S1S2, RRR  Abdomen: Soft, Nondistended, 8 x10cm cm LLQ indurated, edematous erythematous and fluctuant mass. Very tender to palpation , +warmth to touch no guarding, no rebound  Extremities: Warm and well perfused. 2+ peripheral pulses b/l. Calf soft, nontender b/l. No pedal edema.

## 2021-12-26 NOTE — H&P ADULT - ASSESSMENT
INCOMPLETE     61F with PMHx of HTN, HLD, DM (on metformin) PSHx open hysterectomy, open appendectomy, , s/p dx lap DIOGENES and enterotomy on  presents with abdominal bulge, pain, redness, and swelling in left abdominal area in one of port sites. Admits subjective fevers, chills , nausea and feculent vomiting x 2 days 61F with PMHx of HTN, HLD, DM (on metformin) PSHx open hysterectomy, open appendectomy, , s/p dx lap DIOGENES and enterotomy on  presents with abdominal bulge, pain, redness, and swelling in left abdominal area in one of port sites. Admits subjective fevers, chills , nausea and feculent vomiting x 2 days. CT shows a 6 x 4cm abdominal wall abscess with areas of cellulitis .      Admit to Dr Friend  NPO,IVF, IV ABx   Serial Abd Exam   f/up labs inc BCx  IR consult   analgelsics /antiemetic prn  DVT PPx  61F with PMHx of HTN, HLD, DM (on metformin) PSHx open hysterectomy, open appendectomy, , s/p dx lap DIOGENES and enterotomy on  presents with abdominal bulge, pain, redness, and swelling in left abdominal area in one of port sites. Admits subjective fevers, chills , nausea and feculent vomiting x 2 days. CT shows a 6.3 x 3.9 cm left abdominal wall abscess, possibly at a prior trocar site. The abscess involves the skin, subcutaneous fat, abdominal wall   musculature, and extends at least into the properitoneal space. Unclear whether this goes deeper into the abdominal cavity, however it is likely given it is following the trocar tract.    Admit to Dr Friend  NPO,IVF, IV ABx   Serial Abd Exam   f/up labs inc BCx  IR consult   analgelsics /antiemetic prn  DVT PPx  61F with PMHx of HTN, HLD, DM (on metformin) PSHx open hysterectomy, open appendectomy, , s/p dx lap DIOGENES and enterotomy on  presents with abdominal bulge, pain, redness, and swelling in left abdominal area in one of port sites. Admits subjective fevers, chills , nausea and feculent vomiting x 2 days. CT shows a 6.3 x 3.9 cm left abdominal wall abscess, possibly at a prior trocar site. The abscess involves the skin, subcutaneous fat, abdominal wall   musculature, and extends at least into the properitoneal space. Unclear whether this goes deeper into the abdominal cavity, however it is likely given it is following the trocar tract.    Admit to Dr Friend  NPO,IVF, IV ABx   Serial Abd Exam   f/up labs inc BCx  ID consult in AM  IR consult in AM  analgelsics /antiemetic prn  DVT PPx

## 2021-12-26 NOTE — ED PROVIDER NOTE - NSICDXPASTMEDICALHX_GEN_ALL_CORE_FT
PAST MEDICAL HISTORY:  Anxiety     GERD (gastroesophageal reflux disease)     Hypertension     Melanoma     Type 2 diabetes mellitus       Small bowel obstruction

## 2021-12-26 NOTE — H&P ADULT - HISTORY OF PRESENT ILLNESS
61F with PMHx of HTN, HLD, DM (on metformin) PSHx open hysterectomy, open appendectomy, and  presents with abdominal pain associated with nausea and feculent vomiting x 2 days. Pt states she taught it was a stomach virus but symptoms began to worsen over the last todays. Never experienced this in the past. Denied fever, chills, flatus, or bowel function over the last 2 days.     w/ pain, redness, and swelling in left abdominal area in one of port sites. Patient feels subjective fevers and chills. Patient denies any other symptoms. NKDA. 61F with PMHx of HTN, HLD, DM (on metformin) PSHx open hysterectomy, open appendectomy, , s/p dx lap DIOGENES and enterotomy on  presents with abdominal bulge, pain, redness, and swelling in left abdominal area in one of port sites. Admits subjective fevers, chills , nausea and feculent vomiting x 2 days. Denies SOB, lightheadedness, chest pain or any other complaints at this time.  61F with PMHx of HTN, HLD, DM (on metformin) PSHx open hysterectomy, open appendectomy, , s/p dx lap DIOGENES and enterotomy on  presents with abdominal bulge, pain, redness, and swelling in left abdominal area in one of port sites. Admits subjective fevers, chills , nausea and feculent vomiting x 2 days. CT shows a 6 x 4cm abdominal wall abscess with areas of cellulitis .  Denies SOB, lightheadedness , dysuria or any other complaints at this time .

## 2021-12-26 NOTE — ED ADULT NURSE NOTE - OBJECTIVE STATEMENT
Pt. c/o left sided abdominal pain at the incision site s/p SBO in the beginning of December. Pt. was seen 12/6 for infection at the surgical site. Incison site is red and has white puss coming out.

## 2021-12-26 NOTE — PATIENT PROFILE ADULT - FALL HARM RISK - HARM RISK INTERVENTIONS

## 2021-12-26 NOTE — ED PROVIDER NOTE - NSICDXPASTSURGICALHX_GEN_ALL_CORE_FT
PAST SURGICAL HISTORY:  H/O abdominal hysterectomy     H/O:  section     History of appendectomy     Intestinal adhesions s/p lysis of adhesions, repair of enterotomies 2021      History of abdominal surgery

## 2021-12-26 NOTE — H&P ADULT - NSICDXPASTMEDICALHX_GEN_ALL_CORE_FT
PAST MEDICAL HISTORY:  Anxiety     GERD (gastroesophageal reflux disease)     Hypertension     Melanoma     Small bowel obstruction     Type 2 diabetes mellitus

## 2021-12-26 NOTE — H&P ADULT - NSICDXPASTSURGICALHX_GEN_ALL_CORE_FT
PAST SURGICAL HISTORY:  H/O abdominal hysterectomy     H/O:  section     History of abdominal surgery     History of appendectomy     Intestinal adhesions s/p lysis of adhesions, repair of enterotomies 2021

## 2021-12-26 NOTE — ED ADULT NURSE NOTE - NSIMPLEMENTINTERV_GEN_ALL_ED
Implemented All Universal Safety Interventions:  [place] to call system. Call bell, personal items and telephone within reach. Instruct patient to call for assistance. Room bathroom lighting operational. Non-slip footwear when patient is off stretcher. Physically safe environment: no spills, clutter or unnecessary equipment. Stretcher in lowest position, wheels locked, appropriate side rails in place.

## 2021-12-27 ENCOUNTER — TRANSCRIPTION ENCOUNTER (OUTPATIENT)
Age: 61
End: 2021-12-27

## 2021-12-27 LAB
ANION GAP SERPL CALC-SCNC: 5 MMOL/L — SIGNIFICANT CHANGE UP (ref 5–17)
BASOPHILS # BLD AUTO: 0.06 K/UL — SIGNIFICANT CHANGE UP (ref 0–0.2)
BASOPHILS NFR BLD AUTO: 0.6 % — SIGNIFICANT CHANGE UP (ref 0–2)
BUN SERPL-MCNC: 9 MG/DL — SIGNIFICANT CHANGE UP (ref 7–18)
CALCIUM SERPL-MCNC: 8.5 MG/DL — SIGNIFICANT CHANGE UP (ref 8.4–10.5)
CHLORIDE SERPL-SCNC: 108 MMOL/L — SIGNIFICANT CHANGE UP (ref 96–108)
CO2 SERPL-SCNC: 27 MMOL/L — SIGNIFICANT CHANGE UP (ref 22–31)
CREAT SERPL-MCNC: 0.46 MG/DL — LOW (ref 0.5–1.3)
CULTURE RESULTS: NO GROWTH — SIGNIFICANT CHANGE UP
EOSINOPHIL # BLD AUTO: 0.08 K/UL — SIGNIFICANT CHANGE UP (ref 0–0.5)
EOSINOPHIL NFR BLD AUTO: 0.8 % — SIGNIFICANT CHANGE UP (ref 0–6)
GLUCOSE BLDC GLUCOMTR-MCNC: 105 MG/DL — HIGH (ref 70–99)
GLUCOSE BLDC GLUCOMTR-MCNC: 109 MG/DL — HIGH (ref 70–99)
GLUCOSE BLDC GLUCOMTR-MCNC: 159 MG/DL — HIGH (ref 70–99)
GLUCOSE BLDC GLUCOMTR-MCNC: 166 MG/DL — HIGH (ref 70–99)
GLUCOSE SERPL-MCNC: 86 MG/DL — SIGNIFICANT CHANGE UP (ref 70–99)
HCT VFR BLD CALC: 27 % — LOW (ref 34.5–45)
HGB BLD-MCNC: 8.9 G/DL — LOW (ref 11.5–15.5)
IMM GRANULOCYTES NFR BLD AUTO: 0.7 % — SIGNIFICANT CHANGE UP (ref 0–1.5)
LYMPHOCYTES # BLD AUTO: 1.32 K/UL — SIGNIFICANT CHANGE UP (ref 1–3.3)
LYMPHOCYTES # BLD AUTO: 12.5 % — LOW (ref 13–44)
MCHC RBC-ENTMCNC: 29.4 PG — SIGNIFICANT CHANGE UP (ref 27–34)
MCHC RBC-ENTMCNC: 33 GM/DL — SIGNIFICANT CHANGE UP (ref 32–36)
MCV RBC AUTO: 89.1 FL — SIGNIFICANT CHANGE UP (ref 80–100)
MONOCYTES # BLD AUTO: 0.87 K/UL — SIGNIFICANT CHANGE UP (ref 0–0.9)
MONOCYTES NFR BLD AUTO: 8.2 % — SIGNIFICANT CHANGE UP (ref 2–14)
NEUTROPHILS # BLD AUTO: 8.15 K/UL — HIGH (ref 1.8–7.4)
NEUTROPHILS NFR BLD AUTO: 77.2 % — HIGH (ref 43–77)
NRBC # BLD: 0 /100 WBCS — SIGNIFICANT CHANGE UP (ref 0–0)
PLATELET # BLD AUTO: 274 K/UL — SIGNIFICANT CHANGE UP (ref 150–400)
POTASSIUM SERPL-MCNC: 4.2 MMOL/L — SIGNIFICANT CHANGE UP (ref 3.5–5.3)
POTASSIUM SERPL-SCNC: 4.2 MMOL/L — SIGNIFICANT CHANGE UP (ref 3.5–5.3)
RBC # BLD: 3.03 M/UL — LOW (ref 3.8–5.2)
RBC # FLD: 12.3 % — SIGNIFICANT CHANGE UP (ref 10.3–14.5)
SODIUM SERPL-SCNC: 140 MMOL/L — SIGNIFICANT CHANGE UP (ref 135–145)
SPECIMEN SOURCE: SIGNIFICANT CHANGE UP
WBC # BLD: 10.55 K/UL — HIGH (ref 3.8–10.5)
WBC # FLD AUTO: 10.55 K/UL — HIGH (ref 3.8–10.5)

## 2021-12-27 RX ORDER — LANOLIN ALCOHOL/MO/W.PET/CERES
3 CREAM (GRAM) TOPICAL AT BEDTIME
Refills: 0 | Status: COMPLETED | OUTPATIENT
Start: 2021-12-27 | End: 2021-12-27

## 2021-12-27 RX ORDER — CHLORHEXIDINE GLUCONATE 213 G/1000ML
1 SOLUTION TOPICAL ONCE
Refills: 0 | Status: DISCONTINUED | OUTPATIENT
Start: 2021-12-27 | End: 2021-12-28

## 2021-12-27 RX ADMIN — HYDROMORPHONE HYDROCHLORIDE 0.5 MILLIGRAM(S): 2 INJECTION INTRAMUSCULAR; INTRAVENOUS; SUBCUTANEOUS at 12:41

## 2021-12-27 RX ADMIN — HYDROMORPHONE HYDROCHLORIDE 0.5 MILLIGRAM(S): 2 INJECTION INTRAMUSCULAR; INTRAVENOUS; SUBCUTANEOUS at 06:18

## 2021-12-27 RX ADMIN — HYDROMORPHONE HYDROCHLORIDE 0.5 MILLIGRAM(S): 2 INJECTION INTRAMUSCULAR; INTRAVENOUS; SUBCUTANEOUS at 19:34

## 2021-12-27 RX ADMIN — AMPICILLIN SODIUM AND SULBACTAM SODIUM 200 GRAM(S): 250; 125 INJECTION, POWDER, FOR SUSPENSION INTRAMUSCULAR; INTRAVENOUS at 05:26

## 2021-12-27 RX ADMIN — HYDROMORPHONE HYDROCHLORIDE 0.5 MILLIGRAM(S): 2 INJECTION INTRAMUSCULAR; INTRAVENOUS; SUBCUTANEOUS at 05:45

## 2021-12-27 RX ADMIN — ENOXAPARIN SODIUM 40 MILLIGRAM(S): 100 INJECTION SUBCUTANEOUS at 12:15

## 2021-12-27 RX ADMIN — AMPICILLIN SODIUM AND SULBACTAM SODIUM 200 GRAM(S): 250; 125 INJECTION, POWDER, FOR SUSPENSION INTRAMUSCULAR; INTRAVENOUS at 12:15

## 2021-12-27 RX ADMIN — Medication 3 MILLIGRAM(S): at 23:21

## 2021-12-27 RX ADMIN — Medication 2: at 05:38

## 2021-12-27 RX ADMIN — HYDROMORPHONE HYDROCHLORIDE 0.5 MILLIGRAM(S): 2 INJECTION INTRAMUSCULAR; INTRAVENOUS; SUBCUTANEOUS at 00:10

## 2021-12-27 RX ADMIN — AMPICILLIN SODIUM AND SULBACTAM SODIUM 200 GRAM(S): 250; 125 INJECTION, POWDER, FOR SUSPENSION INTRAMUSCULAR; INTRAVENOUS at 18:36

## 2021-12-27 RX ADMIN — HYDROMORPHONE HYDROCHLORIDE 0.5 MILLIGRAM(S): 2 INJECTION INTRAMUSCULAR; INTRAVENOUS; SUBCUTANEOUS at 13:00

## 2021-12-27 RX ADMIN — HYDROMORPHONE HYDROCHLORIDE 0.5 MILLIGRAM(S): 2 INJECTION INTRAMUSCULAR; INTRAVENOUS; SUBCUTANEOUS at 20:04

## 2021-12-27 NOTE — CONSULT NOTE ADULT - ASSESSMENT
61F with PMHx of HTN, HLD, DM (on metformin) PSHx open hysterectomy, open appendectomy, , s/p dx lap DIOGENES and enterotomy on  presents with abdominal bulge, pain, redness, and swelling in left abdominal area in one of port sites. Admits subjective fevers, chills , nausea and feculent vomiting x 2 days. CT shows a 6.3 x 3.9 cm left abdominal wall abscess, possibly at a prior trocar site. The abscess involves the skin, subcutaneous fat, abdominal wall   musculature, and extends at least into the properitoneal space. Unclear whether this goes deeper into the abdominal cavity, however it is likely given it is following the trocar tract.    IR was consulted for Abdominal wall collection drainage

## 2021-12-27 NOTE — CONSULT NOTE ADULT - ATTENDING COMMENTS
61F with PMHx of HTN, HLD, DM (on metformin) PSHx open hysterectomy, open appendectomy, , s/p dx lap DIOGENES and enterotomy on  presents with abdominal bulge, pain, redness, and swelling in left abdominal area in one of port sites. Admits subjective fevers, chills , nausea and feculent vomiting x 2 days. CT shows a 6.3 x 3.9 cm left abdominal wall abscess, possibly at a prior trocar site.    The collection is overmeasured and appears loculated on imaging. As a result, a drainage catheter placement is not recommended at this time. However, aspiration may be performed as needed to help guide antibiotic therapy. Otherwise, continue antibiotics and await evolution/resolution of the collection prior to considering drain placement.    Carol Wood MD  Interventional Radiology

## 2021-12-27 NOTE — CHART NOTE - NSCHARTNOTEFT_GEN_A_CORE
L abdominal wall abscess with small opening, ~6mm, navigated with cotton swab and small hemostat, initially seropurulent drainage -sent for swab culture- then sanguineous drainage after probing. Tracts medially 3.5cm. Packed with iodoform strip.  1% lido was used for pain control, pt tolerated it well. Findings d/w Dr. Friend.

## 2021-12-27 NOTE — CONSULT NOTE ADULT - SUBJECTIVE AND OBJECTIVE BOX
61F with PMHx of HTN, HLD, DM (on metformin) PSHx open hysterectomy, open appendectomy, , s/p dx lap DIOGENES and enterotomy on  presents with abdominal bulge, pain, redness, and swelling in left abdominal area in one of port sites. Admits subjective fevers, chills , nausea and feculent vomiting x 2 days. CT shows a 6 x 4cm abdominal wall abscess with areas of cellulitis .  Denies SOB, lightheadedness , dysuria or any other complaints at this time .     IR was consulted for abdominal wall collection drainage     Vital Signs Last 24 Hrs  T(C): 37 (27 Dec 2021 05:31), Max: 37 (27 Dec 2021 05:31)  T(F): 98.6 (27 Dec 2021 05:31), Max: 98.6 (27 Dec 2021 05:31)  HR: 70 (27 Dec 2021 05:31) (70 - 89)  BP: 107/64 (27 Dec 2021 05:31) (107/64 - 124/68)  BP(mean): --  RR: 17 (27 Dec 2021 05:31) (17 - 19)  SpO2: 96% (27 Dec 2021 05:31) (94% - 99%)    LABS:                        10.1   19.10 )-----------( 350      ( 26 Dec 2021 14:56 )             30.9     26 Dec 2021 14:56    137    |  102    |  13     ----------------------------<  97     4.3     |  27     |  0.63     Ca    9.0        26 Dec 2021 14:56    TPro  8.3    /  Alb  3.1    /  TBili  0.5    /  DBili  x      /  AST  12     /  ALT  15     /  AlkPhos  68     26 Dec 2021 14:56    PT/INR - ( 26 Dec 2021 19:41 )   PT: 15.3 sec;   INR: 1.30 ratio         PTT - ( 26 Dec 2021 19:41 )  PTT:27.6 sec    Basic Metabolic Panel in AM (12.02.21 @ 06:53)    Sodium, Serum: 139 mmol/L    Potassium, Serum: 3.7 mmol/L    Chloride, Serum: 107 mmol/L    Carbon Dioxide, Serum: 24 mmol/L    Anion Gap, Serum: 8 mmol/L    Blood Urea Nitrogen, Serum: 9 mg/dL    Creatinine, Serum: 0.55 mg/dL    Glucose, Serum: 131 mg/dL    Calcium, Total Serum: 7.5 mg/dL    eGFR if Non : 101: Interpretative comment  The units for eGFR are mL/min/1.73M2 (normalized body surface area). The  eGFR is calculated from a serum creatinine using the CKD-EPI equation.  Other variables required for calculation are race, age and sex. Among  patients with chronic kidney disease (CKD), the eGFR is useful in  determining the stage of disease according to KDOQI CKD classification.  All eGFR results are reported numerically with the following  interpretation.          GFR                    With                 Without     (ml/min/1.73 m2)    Kidney Damage       Kidney Damage        >= 90                    Stage 1                     Normal        60-89                    Stage 2                     Decreased GFR        30-59     Stage 3                     Stage 3        15-29                    Stage 4                     Stage 4        < 15                      Stage 5                     Stage 5  Each stage of CKD assumes that the associated GFR level has been in  effect for at least 3 months. Determination of stages one and two (with  eGFR > 59 ml/min/m2) requires estimation of kidney damage for at least 3  months as defined by structural or functional abnormalities.  Limitations: All estimates of GFR will be less accurate for patients at  extremes of muscle mass (including but not limited to frail elderly,  critically ill, or cancer patients), those with unusual diets, and those  with conditions associated with reduced secretion or extrarenal  elimination of creatinine. The eGFR equation is not recommended for use  in patients with unstable creatinine levels. mL/min/1.73M2    eGFR if African American: 117 mL/min/1.73M2    MEDICATIONS  (STANDING):  ampicillin/sulbactam  IVPB 3 Gram(s) IV Intermittent every 6 hours  dextrose 40% Gel 15 Gram(s) Oral once  dextrose 5% + sodium chloride 0.45%. 1000 milliLiter(s) (100 mL/Hr) IV Continuous <Continuous>  dextrose 5%. 1000 milliLiter(s) (50 mL/Hr) IV Continuous <Continuous>  dextrose 5%. 1000 milliLiter(s) (100 mL/Hr) IV Continuous <Continuous>  dextrose 50% Injectable 25 Gram(s) IV Push once  dextrose 50% Injectable 12.5 Gram(s) IV Push once  dextrose 50% Injectable 25 Gram(s) IV Push once  enoxaparin Injectable 40 milliGRAM(s) SubCutaneous daily  glucagon  Injectable 1 milliGRAM(s) IntraMuscular once  insulin lispro (ADMELOG) corrective regimen sliding scale   SubCutaneous every 6 hours    MEDICATIONS  (PRN):  HYDROmorphone  Injectable 0.5 milliGRAM(s) IV Push every 4 hours PRN Moderate Pain (4 - 6)  ondansetron Injectable 4 milliGRAM(s) IV Push every 6 hours PRN Nausea    < from: CT Abdomen and Pelvis w/ IV Cont (12. @ 16:57) >  IMPRESSION:  *  6.3 x 3.9 cm left abdominal wall abscess, possibly at a prior trocar   site. The abscess involves the skin,subcutaneous fat, abdominal wall   musculature, and extends at least into the properitoneal space. Unclear   whether this goes deeper into the abdominal cavity, however it is likely   given it is following the trocar tract.      --- End of Report ---    < end of copied text >

## 2021-12-28 ENCOUNTER — TRANSCRIPTION ENCOUNTER (OUTPATIENT)
Age: 61
End: 2021-12-28

## 2021-12-28 LAB
ANION GAP SERPL CALC-SCNC: 6 MMOL/L — SIGNIFICANT CHANGE UP (ref 5–17)
BASOPHILS # BLD AUTO: 0.05 K/UL — SIGNIFICANT CHANGE UP (ref 0–0.2)
BASOPHILS NFR BLD AUTO: 0.8 % — SIGNIFICANT CHANGE UP (ref 0–2)
BUN SERPL-MCNC: 10 MG/DL — SIGNIFICANT CHANGE UP (ref 7–18)
CALCIUM SERPL-MCNC: 8.5 MG/DL — SIGNIFICANT CHANGE UP (ref 8.4–10.5)
CHLORIDE SERPL-SCNC: 107 MMOL/L — SIGNIFICANT CHANGE UP (ref 96–108)
CO2 SERPL-SCNC: 29 MMOL/L — SIGNIFICANT CHANGE UP (ref 22–31)
CREAT SERPL-MCNC: 0.59 MG/DL — SIGNIFICANT CHANGE UP (ref 0.5–1.3)
EOSINOPHIL # BLD AUTO: 0.19 K/UL — SIGNIFICANT CHANGE UP (ref 0–0.5)
EOSINOPHIL NFR BLD AUTO: 3.2 % — SIGNIFICANT CHANGE UP (ref 0–6)
GLUCOSE BLDC GLUCOMTR-MCNC: 102 MG/DL — HIGH (ref 70–99)
GLUCOSE BLDC GLUCOMTR-MCNC: 123 MG/DL — HIGH (ref 70–99)
GLUCOSE BLDC GLUCOMTR-MCNC: 192 MG/DL — HIGH (ref 70–99)
GLUCOSE BLDC GLUCOMTR-MCNC: 92 MG/DL — SIGNIFICANT CHANGE UP (ref 70–99)
GLUCOSE SERPL-MCNC: 179 MG/DL — HIGH (ref 70–99)
HCT VFR BLD CALC: 25.4 % — LOW (ref 34.5–45)
HGB BLD-MCNC: 8.4 G/DL — LOW (ref 11.5–15.5)
IMM GRANULOCYTES NFR BLD AUTO: 0.5 % — SIGNIFICANT CHANGE UP (ref 0–1.5)
LYMPHOCYTES # BLD AUTO: 1.44 K/UL — SIGNIFICANT CHANGE UP (ref 1–3.3)
LYMPHOCYTES # BLD AUTO: 24.1 % — SIGNIFICANT CHANGE UP (ref 13–44)
MCHC RBC-ENTMCNC: 29.2 PG — SIGNIFICANT CHANGE UP (ref 27–34)
MCHC RBC-ENTMCNC: 33.1 GM/DL — SIGNIFICANT CHANGE UP (ref 32–36)
MCV RBC AUTO: 88.2 FL — SIGNIFICANT CHANGE UP (ref 80–100)
MONOCYTES # BLD AUTO: 0.66 K/UL — SIGNIFICANT CHANGE UP (ref 0–0.9)
MONOCYTES NFR BLD AUTO: 11.1 % — SIGNIFICANT CHANGE UP (ref 2–14)
NEUTROPHILS # BLD AUTO: 3.6 K/UL — SIGNIFICANT CHANGE UP (ref 1.8–7.4)
NEUTROPHILS NFR BLD AUTO: 60.3 % — SIGNIFICANT CHANGE UP (ref 43–77)
NRBC # BLD: 0 /100 WBCS — SIGNIFICANT CHANGE UP (ref 0–0)
PLATELET # BLD AUTO: 246 K/UL — SIGNIFICANT CHANGE UP (ref 150–400)
POTASSIUM SERPL-MCNC: 4 MMOL/L — SIGNIFICANT CHANGE UP (ref 3.5–5.3)
POTASSIUM SERPL-SCNC: 4 MMOL/L — SIGNIFICANT CHANGE UP (ref 3.5–5.3)
RBC # BLD: 2.88 M/UL — LOW (ref 3.8–5.2)
RBC # FLD: 12.2 % — SIGNIFICANT CHANGE UP (ref 10.3–14.5)
SODIUM SERPL-SCNC: 142 MMOL/L — SIGNIFICANT CHANGE UP (ref 135–145)
WBC # BLD: 5.97 K/UL — SIGNIFICANT CHANGE UP (ref 3.8–10.5)
WBC # FLD AUTO: 5.97 K/UL — SIGNIFICANT CHANGE UP (ref 3.8–10.5)

## 2021-12-28 PROCEDURE — 99232 SBSQ HOSP IP/OBS MODERATE 35: CPT

## 2021-12-28 PROCEDURE — 10060 I&D ABSCESS SIMPLE/SINGLE: CPT | Mod: 1L,78

## 2021-12-28 RX ORDER — SODIUM CHLORIDE 9 MG/ML
1000 INJECTION, SOLUTION INTRAVENOUS
Refills: 0 | Status: DISCONTINUED | OUTPATIENT
Start: 2021-12-28 | End: 2021-12-29

## 2021-12-28 RX ORDER — LANOLIN ALCOHOL/MO/W.PET/CERES
3 CREAM (GRAM) TOPICAL AT BEDTIME
Refills: 0 | Status: DISCONTINUED | OUTPATIENT
Start: 2021-12-28 | End: 2021-12-29

## 2021-12-28 RX ORDER — FENTANYL CITRATE 50 UG/ML
50 INJECTION INTRAVENOUS
Refills: 0 | Status: DISCONTINUED | OUTPATIENT
Start: 2021-12-28 | End: 2021-12-28

## 2021-12-28 RX ORDER — FENTANYL CITRATE 50 UG/ML
25 INJECTION INTRAVENOUS
Refills: 0 | Status: DISCONTINUED | OUTPATIENT
Start: 2021-12-28 | End: 2021-12-28

## 2021-12-28 RX ORDER — ONDANSETRON 8 MG/1
4 TABLET, FILM COATED ORAL EVERY 6 HOURS
Refills: 0 | Status: DISCONTINUED | OUTPATIENT
Start: 2021-12-28 | End: 2021-12-29

## 2021-12-28 RX ORDER — INSULIN LISPRO 100/ML
VIAL (ML) SUBCUTANEOUS EVERY 6 HOURS
Refills: 0 | Status: DISCONTINUED | OUTPATIENT
Start: 2021-12-28 | End: 2021-12-29

## 2021-12-28 RX ORDER — HYDROMORPHONE HYDROCHLORIDE 2 MG/ML
0.5 INJECTION INTRAMUSCULAR; INTRAVENOUS; SUBCUTANEOUS EVERY 4 HOURS
Refills: 0 | Status: DISCONTINUED | OUTPATIENT
Start: 2021-12-28 | End: 2021-12-29

## 2021-12-28 RX ORDER — AMPICILLIN SODIUM AND SULBACTAM SODIUM 250; 125 MG/ML; MG/ML
3 INJECTION, POWDER, FOR SUSPENSION INTRAMUSCULAR; INTRAVENOUS EVERY 6 HOURS
Refills: 0 | Status: DISCONTINUED | OUTPATIENT
Start: 2021-12-28 | End: 2021-12-29

## 2021-12-28 RX ORDER — SODIUM CHLORIDE 9 MG/ML
1000 INJECTION, SOLUTION INTRAVENOUS
Refills: 0 | Status: DISCONTINUED | OUTPATIENT
Start: 2021-12-28 | End: 2021-12-28

## 2021-12-28 RX ORDER — DEXTROSE 50 % IN WATER 50 %
25 SYRINGE (ML) INTRAVENOUS ONCE
Refills: 0 | Status: DISCONTINUED | OUTPATIENT
Start: 2021-12-28 | End: 2021-12-29

## 2021-12-28 RX ADMIN — HYDROMORPHONE HYDROCHLORIDE 0.5 MILLIGRAM(S): 2 INJECTION INTRAMUSCULAR; INTRAVENOUS; SUBCUTANEOUS at 18:22

## 2021-12-28 RX ADMIN — AMPICILLIN SODIUM AND SULBACTAM SODIUM 200 GRAM(S): 250; 125 INJECTION, POWDER, FOR SUSPENSION INTRAMUSCULAR; INTRAVENOUS at 18:09

## 2021-12-28 RX ADMIN — AMPICILLIN SODIUM AND SULBACTAM SODIUM 200 GRAM(S): 250; 125 INJECTION, POWDER, FOR SUSPENSION INTRAMUSCULAR; INTRAVENOUS at 23:54

## 2021-12-28 RX ADMIN — HYDROMORPHONE HYDROCHLORIDE 0.5 MILLIGRAM(S): 2 INJECTION INTRAMUSCULAR; INTRAVENOUS; SUBCUTANEOUS at 06:00

## 2021-12-28 RX ADMIN — AMPICILLIN SODIUM AND SULBACTAM SODIUM 200 GRAM(S): 250; 125 INJECTION, POWDER, FOR SUSPENSION INTRAMUSCULAR; INTRAVENOUS at 00:36

## 2021-12-28 RX ADMIN — HYDROMORPHONE HYDROCHLORIDE 0.5 MILLIGRAM(S): 2 INJECTION INTRAMUSCULAR; INTRAVENOUS; SUBCUTANEOUS at 22:00

## 2021-12-28 RX ADMIN — Medication 3 MILLIGRAM(S): at 23:54

## 2021-12-28 RX ADMIN — AMPICILLIN SODIUM AND SULBACTAM SODIUM 200 GRAM(S): 250; 125 INJECTION, POWDER, FOR SUSPENSION INTRAMUSCULAR; INTRAVENOUS at 12:38

## 2021-12-28 RX ADMIN — HYDROMORPHONE HYDROCHLORIDE 0.5 MILLIGRAM(S): 2 INJECTION INTRAMUSCULAR; INTRAVENOUS; SUBCUTANEOUS at 21:41

## 2021-12-28 RX ADMIN — Medication 2: at 05:34

## 2021-12-28 RX ADMIN — HYDROMORPHONE HYDROCHLORIDE 0.5 MILLIGRAM(S): 2 INJECTION INTRAMUSCULAR; INTRAVENOUS; SUBCUTANEOUS at 05:32

## 2021-12-28 RX ADMIN — ENOXAPARIN SODIUM 40 MILLIGRAM(S): 100 INJECTION SUBCUTANEOUS at 12:38

## 2021-12-28 RX ADMIN — AMPICILLIN SODIUM AND SULBACTAM SODIUM 200 GRAM(S): 250; 125 INJECTION, POWDER, FOR SUSPENSION INTRAMUSCULAR; INTRAVENOUS at 05:34

## 2021-12-28 RX ADMIN — SODIUM CHLORIDE 100 MILLILITER(S): 9 INJECTION, SOLUTION INTRAVENOUS at 05:33

## 2021-12-28 RX ADMIN — HYDROMORPHONE HYDROCHLORIDE 0.5 MILLIGRAM(S): 2 INJECTION INTRAMUSCULAR; INTRAVENOUS; SUBCUTANEOUS at 17:28

## 2021-12-28 NOTE — DISCHARGE NOTE PROVIDER - NSDCFUADDINST_GEN_ALL_CORE_FT
please replace overlying dressing every day. the drain in place is to stay in place until your surgeon feels it is ready to be removed. please call for appointment with Dr. Friend in 1 week. No heavy lifting >15 lbs until cleared by your surgeon. Please keep your appointment for your scheduled procedure on 1/3/21 with Dr. Johnson.    Please take your pain medications as prescribed.

## 2021-12-28 NOTE — DISCHARGE NOTE PROVIDER - HOSPITAL COURSE
61F with history of multiple abdominal surgeries, and most recently underwent laparoscopic DIOGENES in 11/2021, presented with induration, erythema and underlying fluctuance at one of the left sided port sites. CT scan demonstrated abdominal wall abscess. Patient admitted on 12/26/21 and started on IV abx. Taken to OR on 12/28/21 for drainage of abdominal wall abscess. Minimal output during procedure, abscess likely spontaneously drained. Culture sent, packing and penrose drain placed, and patient returned to floors in stable condition. On POD1, packing removed, and patient discharged home in stable condition, with instructions for follow up. Of note, patient scheduled for melanoma removal from back on 1/3/21, already scheduled. 61F with history of multiple abdominal surgeries, and most recently underwent laparoscopic DIOGENES in 11/2021, presented with induration, erythema and underlying fluctuance at one of the left sided port sites. CT scan demonstrated abdominal wall abscess. Patient admitted on 12/26/21 and started on IV abx. Taken to OR on 12/28/21 for drainage of abdominal wall abscess. Minimal output during procedure, abscess likely spontaneously drained. Culture sent, packing and penrose drain placed, and patient returned to floors in stable condition. On POD1, packing removed, and patient discharged home in stable condition, with instructions for follow up. Of note, patient scheduled for melanoma removal from back on 1/3/21, will follow up with Dr. Johnson as appointed.

## 2021-12-28 NOTE — MEDICAL STUDENT PROGRESS NOTE(EDUCATION) - NS MD HP STUD ASPLAN PLAN FT
·	NPO  ·	IVF  ·	ABx  ·	consultation with IR  ·	f/u with CBC  ·	serial abd exam  ·	monitor BP and bl glucose  ·	DVT prophylaxis  ·	ambulation 
-OR 12/28/21  -NPO after midnight except PO meds  -IVF when NPO  -con't abx  -Chlorhexidin  -DVT prophylaxis  -monitor Bl glucose

## 2021-12-28 NOTE — DISCHARGE NOTE PROVIDER - NSDCMRMEDTOKEN_GEN_ALL_CORE_FT
acetaminophen 500 mg oral tablet: 2 tab(s) orally every 6 hours, As Needed   amLODIPine 10 mg oral tablet: 1 tab(s) orally once a day  atenolol 100 mg oral tablet: 1 tab(s) orally once a day  atorvastatin 20 mg oral tablet: 1 tab(s) orally once a day (at bedtime)  hydrOXYzine hydrochloride 25 mg oral tablet: 1 tab(s) orally 3 times a day  metFORMIN 500 mg oral tablet: 1 tab(s) orally 2 times a day  pantoprazole 40 mg oral delayed release tablet: 1 tab(s) orally once a day

## 2021-12-28 NOTE — BRIEF OPERATIVE NOTE - OPERATION/FINDINGS
drainage of abdominal wall abscess, minimal output, culture sent and penrose drain placed; packed with 1/2 inch packing

## 2021-12-28 NOTE — DISCHARGE NOTE PROVIDER - NSDCCPCAREPLAN_GEN_ALL_CORE_FT
PRINCIPAL DISCHARGE DIAGNOSIS  Diagnosis: Cutaneous abscess  Assessment and Plan of Treatment: An infection formed around a previous port site on the abdominal wall. This was operatively treated with drainage.

## 2021-12-28 NOTE — MEDICAL STUDENT PROGRESS NOTE(EDUCATION) - SUBJECTIVE AND OBJECTIVE BOX
Ms. Hathaway 60 y/o female admitted on 2021 for left sided abdominal abscess possibly from prior abdominal operation portside infection.    No over night acute event, patient resting comfortably, no complain, no nausea vomiting, no labored breathing, patient hemodynamically stable.   H &P adult:   61F with PMHx of HTN, HLD, DM (on metformin) PSHx open hysterectomy, open appendectomy, , s/p dx lap DIOGENES and enterotomy on  presents with abdominal bulge, pain, redness, and swelling in left abdominal area in one of port sites. Admits subjective fevers, chills , nausea and feculent vomiting x 2 days. CT shows a 6 x 4cm abdominal wall abscess with areas of cellulitis .  Denies SOB, lightheadedness , dysuria or any other complaints at this time .   IR was consulted for abdominal wall collection drainage     MEDICATIONS  (STANDING):  ampicillin/sulbactam  IVPB 3 Gram(s) IV Intermittent every 6 hours  dextrose 40% Gel 15 Gram(s) Oral once  dextrose 5% + sodium chloride 0.45%. 1000 milliLiter(s) (100 mL/Hr) IV Continuous <Continuous>  dextrose 5%. 1000 milliLiter(s) (50 mL/Hr) IV Continuous <Continuous>  dextrose 5%. 1000 milliLiter(s) (100 mL/Hr) IV Continuous <Continuous>  dextrose 50% Injectable 25 Gram(s) IV Push once  dextrose 50% Injectable 12.5 Gram(s) IV Push once  dextrose 50% Injectable 25 Gram(s) IV Push once  enoxaparin Injectable 40 milliGRAM(s) SubCutaneous daily  glucagon  Injectable 1 milliGRAM(s) IntraMuscular once  insulin lispro (ADMELOG) corrective regimen sliding scale   SubCutaneous every 6 hours    MEDICATIONS  (PRN):  HYDROmorphone  Injectable 0.5 milliGRAM(s) IV Push every 4 hours PRN Moderate Pain (4 - 6)  ondansetron Injectable 4 milliGRAM(s) IV Push every 6 hours PRN Nausea    Vital Signs Last 24 Hrs  T(C): 37 (27 Dec 2021 05:31), Max: 37 (27 Dec 2021 05:31)  T(F): 98.6 (27 Dec 2021 05:31), Max: 98.6 (27 Dec 2021 05:31)  HR: 70 (27 Dec 2021 05:31) (70 - 89)  BP: 107/64 (27 Dec 2021 05:31) (107/64 - 124/68)  BP(mean): --  RR: 17 (27 Dec 2021 05:31) (17 - 19)  SpO2: 96% (27 Dec 2021 05:31) (94% - 99%)    Physical exam:  General: patient alert, resting comfortably, no labored breathing  Neuro: alert and oriented, GCS 15, cranial nerve II-XII intact  Pul: Lungs CTA bilaterally, no w/r/c  CVS: RRR, S1S2, no m/g/r  Abd: soft, tender, bandage soiled with drainage from the pus  : no dysuria hematuria  Extremities: no pedal edema    I&O's Detail    26 Dec 2021 07:01  -  27 Dec 2021 07:00  --------------------------------------------------------  IN:    dextrose 5% + sodium chloride 0.45%: 500 mL    IV PiggyBack: 100 mL  Total IN: 600 mL    OUT:  Total OUT: 0 mL    Total NET: 600 mL        Lab:   CBC:                        8.9    10.55 )-----------( 274      ( 27 Dec 2021 10:06 )             27.0   Chem complete:      140  |  108  |  9   ----------------------------<  86  4.2   |  27  |  0.46<L>    Ca    8.5      27 Dec 2021 10:06    TPro  8.3  /  Alb  3.1<L>  /  TBili  0.5  /  DBili  x   /  AST  12  /  ALT  15  /  AlkPhos  68      Imaging:  CT abdomen and pelvis with IV contrast:   6.3 x 3.9 cm left abdominal wall abscess, possibly at a prior trocar site. The abscess involves the skin, subcutaneous fat, abdominal wall musculature, and extends at least into the properitoneal space. Unclear whether this goes deeper into the abdominal cavity, however it is likely given it is following the trocar tract.          
 62 y/o female admitted on 12/26/2021 for left sided abdominal abscess possibly from prior abdominal operation portside infection.    No over night acute event, patient resting comfortably, no complain, no nausea, vomiting, or labored breathing, patient hemodynamically stable.     MEDICATIONS  (STANDING):  ampicillin/sulbactam  IVPB 3 Gram(s) IV Intermittent every 6 hours  chlorhexidine 2% Cloths 1 Application(s) Topical once  dextrose 40% Gel 15 Gram(s) Oral once  dextrose 5% + sodium chloride 0.45%. 1000 milliLiter(s) (100 mL/Hr) IV Continuous <Continuous>  dextrose 5%. 1000 milliLiter(s) (50 mL/Hr) IV Continuous <Continuous>  dextrose 5%. 1000 milliLiter(s) (100 mL/Hr) IV Continuous <Continuous>  dextrose 50% Injectable 25 Gram(s) IV Push once  dextrose 50% Injectable 12.5 Gram(s) IV Push once  dextrose 50% Injectable 25 Gram(s) IV Push once  enoxaparin Injectable 40 milliGRAM(s) SubCutaneous daily  glucagon  Injectable 1 milliGRAM(s) IntraMuscular once  insulin lispro (ADMELOG) corrective regimen sliding scale   SubCutaneous every 6 hours    MEDICATIONS  (PRN):  HYDROmorphone  Injectable 0.5 milliGRAM(s) IV Push every 4 hours PRN Moderate Pain (4 - 6)  ondansetron Injectable 4 milliGRAM(s) IV Push every 6 hours PRN Nausea    Vital Signs Last 24 Hrs  T(C): 37 (28 Dec 2021 05:05), Max: 37 (28 Dec 2021 05:05)  T(F): 98.6 (28 Dec 2021 05:05), Max: 98.6 (28 Dec 2021 05:05)  HR: 69 (28 Dec 2021 05:05) (69 - 76)  BP: 128/85 (28 Dec 2021 05:05) (112/67 - 128/85)  BP(mean): --  RR: 18 (28 Dec 2021 05:05) (18 - 18)  SpO2: 95% (28 Dec 2021 05:05) (95% - 98%)    Physical exam:   General: patient alert, resting comfortably, no labored breathing  Neuro: alert and oriented, GCS 15, cranial nerve II-XII intact  Pul: Lungs CTA bilaterally, no w/r/c  CVS: RRR, S1S2, no m/g/r  Abd: soft, tender, bandage soiled with drainage from the pus  : no dysuria hematuria  Extremities: no pedal edema    I&O's Detail    26 Dec 2021 07:01  -  27 Dec 2021 07:00  --------------------------------------------------------  IN:    dextrose 5% + sodium chloride 0.45%: 500 mL    IV PiggyBack: 100 mL  Total IN: 600 mL    OUT:  Total OUT: 0 mL    Total NET: 600 mL      27 Dec 2021 07:01  -  28 Dec 2021 06:30  --------------------------------------------------------  IN:    dextrose 5% + sodium chloride 0.45%: 500 mL    IV PiggyBack: 100 mL  Total IN: 600 mL    OUT:  Total OUT: 0 mL    Total NET: 600 mL  Lab:     CBC:                      8.4    5.97  )-----------( 246      ( 28 Dec 2021 05:46 )             25.4     Chem Complete:   12-27    140  |  108  |  9   ----------------------------<  86  4.2   |  27  |  0.46<L>    Ca    8.5      27 Dec 2021 10:06    TPro  8.3  /  Alb  3.1<L>  /  TBili  0.5  /  DBili  x   /  AST  12  /  ALT  15  /  AlkPhos  68  12-26

## 2021-12-28 NOTE — DISCHARGE NOTE PROVIDER - NSDCCPTREATMENT_GEN_ALL_CORE_FT
PRINCIPAL PROCEDURE  Procedure: Drainage, abscess, abdomen  Findings and Treatment: You have had the abscess of your abdominal wall surgical drained. This will heal on its own. There is a small drain in place, which is to remain in place until your surgeon decides to remove it. You have also been sent home with dressings, which are to be replaced every day. Please call for follow up with your surgeon in 1 week.       PRINCIPAL PROCEDURE  Procedure: Drainage, abscess, abdomen  Findings and Treatment: You have had the abscess of your abdominal wall surgical drained. This will heal on its own. There is a small drain in place, which is to remain in place until your surgeon decides to remove it. You have also been sent home with dressings, which are to be replaced every day. Please call for follow up with your surgeon in 1 week. Take ibuprofen and/or Tylenol for pain.

## 2021-12-28 NOTE — MEDICAL STUDENT PROGRESS NOTE(EDUCATION) - NS MD HP STUD ASPLAN ASSES FT
60 y/o female with left sided abdominal abscess, stable
62 y/o female admitted on 12/26/2021 for left sided abdominal abscess possibly from prior abdominal operation portside/ trochar tract infection, stable

## 2021-12-28 NOTE — DISCHARGE NOTE PROVIDER - CARE PROVIDER_API CALL
Gerardo Friend (MD)  Surgery  [address] [place] [address]  Phone: [phone]  Fax: [phone]  Established Patient  Follow Up Time: 1 week

## 2021-12-29 ENCOUNTER — APPOINTMENT (OUTPATIENT)
Dept: SURGICAL ONCOLOGY | Facility: AMBULATORY SURGERY CENTER | Age: 61
End: 2021-12-29

## 2021-12-29 ENCOUNTER — TRANSCRIPTION ENCOUNTER (OUTPATIENT)
Age: 61
End: 2021-12-29

## 2021-12-29 VITALS
DIASTOLIC BLOOD PRESSURE: 74 MMHG | TEMPERATURE: 98 F | HEART RATE: 76 BPM | OXYGEN SATURATION: 97 % | RESPIRATION RATE: 18 BRPM | SYSTOLIC BLOOD PRESSURE: 114 MMHG

## 2021-12-29 LAB
CULTURE RESULTS: SIGNIFICANT CHANGE UP
GLUCOSE BLDC GLUCOMTR-MCNC: 144 MG/DL — HIGH (ref 70–99)
GLUCOSE BLDC GLUCOMTR-MCNC: 223 MG/DL — HIGH (ref 70–99)
GLUCOSE BLDC GLUCOMTR-MCNC: 259 MG/DL — HIGH (ref 70–99)
SPECIMEN SOURCE: SIGNIFICANT CHANGE UP

## 2021-12-29 PROCEDURE — 74177 CT ABD & PELVIS W/CONTRAST: CPT | Mod: MA

## 2021-12-29 PROCEDURE — 93005 ELECTROCARDIOGRAM TRACING: CPT

## 2021-12-29 PROCEDURE — 99285 EMERGENCY DEPT VISIT HI MDM: CPT

## 2021-12-29 PROCEDURE — 83605 ASSAY OF LACTIC ACID: CPT

## 2021-12-29 PROCEDURE — 71045 X-RAY EXAM CHEST 1 VIEW: CPT

## 2021-12-29 PROCEDURE — 85025 COMPLETE CBC W/AUTO DIFF WBC: CPT

## 2021-12-29 PROCEDURE — 82962 GLUCOSE BLOOD TEST: CPT

## 2021-12-29 PROCEDURE — 87070 CULTURE OTHR SPECIMN AEROBIC: CPT

## 2021-12-29 PROCEDURE — 86900 BLOOD TYPING SEROLOGIC ABO: CPT

## 2021-12-29 PROCEDURE — 87635 SARS-COV-2 COVID-19 AMP PRB: CPT

## 2021-12-29 PROCEDURE — 86850 RBC ANTIBODY SCREEN: CPT

## 2021-12-29 PROCEDURE — 36415 COLL VENOUS BLD VENIPUNCTURE: CPT

## 2021-12-29 PROCEDURE — 87077 CULTURE AEROBIC IDENTIFY: CPT

## 2021-12-29 PROCEDURE — 87040 BLOOD CULTURE FOR BACTERIA: CPT

## 2021-12-29 PROCEDURE — 81003 URINALYSIS AUTO W/O SCOPE: CPT

## 2021-12-29 PROCEDURE — 87086 URINE CULTURE/COLONY COUNT: CPT

## 2021-12-29 PROCEDURE — 85730 THROMBOPLASTIN TIME PARTIAL: CPT

## 2021-12-29 PROCEDURE — C9399: CPT

## 2021-12-29 PROCEDURE — 80048 BASIC METABOLIC PNL TOTAL CA: CPT

## 2021-12-29 PROCEDURE — 86901 BLOOD TYPING SEROLOGIC RH(D): CPT

## 2021-12-29 PROCEDURE — 80053 COMPREHEN METABOLIC PANEL: CPT

## 2021-12-29 PROCEDURE — 85610 PROTHROMBIN TIME: CPT

## 2021-12-29 RX ORDER — INSULIN LISPRO 100/ML
VIAL (ML) SUBCUTANEOUS
Refills: 0 | Status: DISCONTINUED | OUTPATIENT
Start: 2021-12-29 | End: 2021-12-29

## 2021-12-29 RX ORDER — OXYCODONE AND ACETAMINOPHEN 5; 325 MG/1; MG/1
1 TABLET ORAL ONCE
Refills: 0 | Status: DISCONTINUED | OUTPATIENT
Start: 2021-12-29 | End: 2021-12-29

## 2021-12-29 RX ADMIN — HYDROMORPHONE HYDROCHLORIDE 0.5 MILLIGRAM(S): 2 INJECTION INTRAMUSCULAR; INTRAVENOUS; SUBCUTANEOUS at 03:01

## 2021-12-29 RX ADMIN — AMPICILLIN SODIUM AND SULBACTAM SODIUM 200 GRAM(S): 250; 125 INJECTION, POWDER, FOR SUSPENSION INTRAMUSCULAR; INTRAVENOUS at 06:05

## 2021-12-29 RX ADMIN — HYDROMORPHONE HYDROCHLORIDE 0.5 MILLIGRAM(S): 2 INJECTION INTRAMUSCULAR; INTRAVENOUS; SUBCUTANEOUS at 09:36

## 2021-12-29 RX ADMIN — SODIUM CHLORIDE 100 MILLILITER(S): 9 INJECTION, SOLUTION INTRAVENOUS at 09:36

## 2021-12-29 RX ADMIN — HYDROMORPHONE HYDROCHLORIDE 0.5 MILLIGRAM(S): 2 INJECTION INTRAMUSCULAR; INTRAVENOUS; SUBCUTANEOUS at 03:17

## 2021-12-29 RX ADMIN — AMPICILLIN SODIUM AND SULBACTAM SODIUM 200 GRAM(S): 250; 125 INJECTION, POWDER, FOR SUSPENSION INTRAMUSCULAR; INTRAVENOUS at 12:05

## 2021-12-29 RX ADMIN — Medication 6: at 00:13

## 2021-12-29 RX ADMIN — HYDROMORPHONE HYDROCHLORIDE 0.5 MILLIGRAM(S): 2 INJECTION INTRAMUSCULAR; INTRAVENOUS; SUBCUTANEOUS at 07:21

## 2021-12-29 RX ADMIN — ENOXAPARIN SODIUM 40 MILLIGRAM(S): 100 INJECTION SUBCUTANEOUS at 12:30

## 2021-12-29 NOTE — DISCHARGE NOTE NURSING/CASE MANAGEMENT/SOCIAL WORK - PATIENT PORTAL LINK FT
You can access the FollowMyHealth Patient Portal offered by [facility] by registering at the following website: [email or web address]. By joining [employer]’s FollowMyHealth portal, you will also be able to view your health information using other applications (apps) compatible with our system.

## 2021-12-29 NOTE — CHART NOTE - NSCHARTNOTEFT_GEN_A_CORE
s/p I &D abdominal wall abscess s/p I &D abdominal wall abscess 12/28  c/o pain at operative site    Voided  no other complaints    Dressing mildly saturated    Change dressing later today  Reg diet  f/u cultures

## 2021-12-29 NOTE — PROGRESS NOTE ADULT - SUBJECTIVE AND OBJECTIVE BOX
Vital Signs Last 24 Hrs  T(C): 37 (28 Dec 2021 05:05), Max: 37 (28 Dec 2021 05:05)  T(F): 98.6 (28 Dec 2021 05:05), Max: 98.6 (28 Dec 2021 05:05)  HR: 69 (28 Dec 2021 05:05) (69 - 76)  BP: 128/85 (28 Dec 2021 05:05) (112/67 - 128/85)  BP(mean): --  RR: 18 (28 Dec 2021 05:05) (18 - 18)  SpO2: 95% (28 Dec 2021 05:05) (95% - 98%)    I&O's Detail    27 Dec 2021 07:01  -  28 Dec 2021 07:00  --------------------------------------------------------  IN:    dextrose 5% + sodium chloride 0.45%: 800 mL    IV PiggyBack: 200 mL  Total IN: 1000 mL    OUT:  Total OUT: 0 mL    Total NET: 1000 mL                                8.4    5.97  )-----------( 246      ( 28 Dec 2021 05:46 )             25.4       12-28    142  |  107  |  10  ----------------------------<  179<H>  4.0   |  29  |  0.59    Ca    8.5      28 Dec 2021 05:46    TPro  8.3  /  Alb  3.1<L>  /  TBili  0.5  /  DBili  x   /  AST  12  /  ALT  15  /  AlkPhos  68  12-26      PT/INR - ( 26 Dec 2021 19:41 )   PT: 15.3 sec;   INR: 1.30 ratio         PTT - ( 26 Dec 2021 19:41 )  PTT:27.6 sec      PLAN:  Pt. to have area debrided today  Will plan on melanoma surgery as an out-patient on Monday  January 3.      
INTERVAL HPI/OVERNIGHT EVENTS:    No acute events overnight.   Pt resting comfortably. No acute complaints.   afebrile  Denies N/V    MEDICATIONS  (STANDING):  ampicillin/sulbactam  IVPB 3 Gram(s) IV Intermittent every 6 hours  chlorhexidine 2% Cloths 1 Application(s) Topical once  dextrose 40% Gel 15 Gram(s) Oral once  dextrose 5% + sodium chloride 0.45%. 1000 milliLiter(s) (100 mL/Hr) IV Continuous <Continuous>  dextrose 5%. 1000 milliLiter(s) (50 mL/Hr) IV Continuous <Continuous>  dextrose 5%. 1000 milliLiter(s) (100 mL/Hr) IV Continuous <Continuous>  dextrose 50% Injectable 25 Gram(s) IV Push once  dextrose 50% Injectable 12.5 Gram(s) IV Push once  dextrose 50% Injectable 25 Gram(s) IV Push once  enoxaparin Injectable 40 milliGRAM(s) SubCutaneous daily  glucagon  Injectable 1 milliGRAM(s) IntraMuscular once  insulin lispro (ADMELOG) corrective regimen sliding scale   SubCutaneous every 6 hours    MEDICATIONS  (PRN):  HYDROmorphone  Injectable 0.5 milliGRAM(s) IV Push every 4 hours PRN Moderate Pain (4 - 6)  ondansetron Injectable 4 milliGRAM(s) IV Push every 6 hours PRN Nausea      Vital Signs Last 24 Hrs  T(C): 37 (28 Dec 2021 05:05), Max: 37 (28 Dec 2021 05:05)  T(F): 98.6 (28 Dec 2021 05:05), Max: 98.6 (28 Dec 2021 05:05)  HR: 69 (28 Dec 2021 05:05) (69 - 76)  BP: 128/85 (28 Dec 2021 05:05) (112/67 - 128/85)  BP(mean): --  RR: 18 (28 Dec 2021 05:05) (18 - 18)  SpO2: 95% (28 Dec 2021 05:05) (95% - 98%)      PHYSICAL EXAM  General: Alert and oriented, not in acute distress  Resp: Breathing unlabored  Abdomen: Soft, nondistended, nontender; wound opening ~6mm   : No de anda catheter, no dysuria or hematuria  Extremities: No pedal edema    I&O's Detail    27 Dec 2021 07:01  -  28 Dec 2021 07:00  --------------------------------------------------------  IN:    dextrose 5% + sodium chloride 0.45%: 800 mL    IV PiggyBack: 200 mL  Total IN: 1000 mL    OUT:  Total OUT: 0 mL    Total NET: 1000 mL          LABS:                        8.4    5.97  )-----------( 246      ( 28 Dec 2021 05:46 )             25.4             12-28    142  |  107  |  10  ----------------------------<  179<H>  4.0   |  29  |  0.59    Ca    8.5      28 Dec 2021 05:46    TPro  8.3  /  Alb  3.1<L>  /  TBili  0.5  /  DBili  x   /  AST  12  /  ALT  15  /  AlkPhos  68  12-26      
INTERVAL HPI/OVERNIGHT EVENTS:  Patient seen and examined at bedside   Pt resting comfortably. No acute complaints.   Patient denies N/V, chest pain, shortness of breath, fever, chills.     MEDICATIONS  (STANDING):  ampicillin/sulbactam  IVPB 3 Gram(s) IV Intermittent every 6 hours  dextrose 40% Gel 15 Gram(s) Oral once  dextrose 5% + sodium chloride 0.45%. 1000 milliLiter(s) (100 mL/Hr) IV Continuous <Continuous>  dextrose 5%. 1000 milliLiter(s) (50 mL/Hr) IV Continuous <Continuous>  dextrose 5%. 1000 milliLiter(s) (100 mL/Hr) IV Continuous <Continuous>  dextrose 50% Injectable 25 Gram(s) IV Push once  dextrose 50% Injectable 12.5 Gram(s) IV Push once  dextrose 50% Injectable 25 Gram(s) IV Push once  enoxaparin Injectable 40 milliGRAM(s) SubCutaneous daily  glucagon  Injectable 1 milliGRAM(s) IntraMuscular once  insulin lispro (ADMELOG) corrective regimen sliding scale   SubCutaneous every 6 hours    MEDICATIONS  (PRN):  HYDROmorphone  Injectable 0.5 milliGRAM(s) IV Push every 4 hours PRN Moderate Pain (4 - 6)  ondansetron Injectable 4 milliGRAM(s) IV Push every 6 hours PRN Nausea      Vital Signs Last 24 Hrs  T(C): 37 (27 Dec 2021 05:31), Max: 37 (27 Dec 2021 05:31)  T(F): 98.6 (27 Dec 2021 05:31), Max: 98.6 (27 Dec 2021 05:31)  HR: 70 (27 Dec 2021 05:31) (70 - 89)  BP: 107/64 (27 Dec 2021 05:31) (107/64 - 124/68)  RR: 17 (27 Dec 2021 05:31) (17 - 19)  SpO2: 96% (27 Dec 2021 05:31) (94% - 99%)    Physical:  General: A&Ox3. NAD.  Respiratory: non labored.  Abdomen: Soft, tender to palpation in the LLQ near draining abscess, no rebound tenderness, no guarding, no palpable masses,   Extremities: non edematous, no calf pain bilaterally,    I&O's Detail    26 Dec 2021 07:01  -  27 Dec 2021 07:00  --------------------------------------------------------  IN:    dextrose 5% + sodium chloride 0.45%: 500 mL    IV PiggyBack: 100 mL  Total IN: 600 mL    OUT:  Total OUT: 0 mL    Total NET: 600 mL          LABS:                        10.1   19.10 )-----------( 350      ( 26 Dec 2021 14:56 )             30.9             12-26    137  |  102  |  13  ----------------------------<  97  4.3   |  27  |  0.63    Ca    9.0      26 Dec 2021 14:56    TPro  8.3  /  Alb  3.1<L>  /  TBili  0.5  /  DBili  x   /  AST  12  /  ALT  15  /  AlkPhos  68  12-26    PT/INR - ( 26 Dec 2021 19:41 )   PT: 15.3 sec;   INR: 1.30 ratio         PTT - ( 26 Dec 2021 19:41 )  PTT:27.6 sec      
Patient seen and examined at bedside. No acute issues overnight. Patient states that she is in significant pain, but otherwise tolerating diet, no nausea/vomiting.    Vital Signs Last 24 Hrs  T(C): 36.7 (29 Dec 2021 05:42), Max: 36.8 (28 Dec 2021 20:34)  T(F): 98.1 (29 Dec 2021 05:42), Max: 98.2 (28 Dec 2021 20:34)  HR: 84 (29 Dec 2021 05:42) (77 - 95)  BP: 124/75 (29 Dec 2021 05:42) (124/69 - 140/75)  BP(mean): 89 (28 Dec 2021 15:02) (84 - 92)  RR: 18 (29 Dec 2021 05:42) (13 - 22)  SpO2: 97% (29 Dec 2021 05:42) (93% - 100%)    General: NAD  Cardio: RRR  Resp: normal resp effort  Abd: soft, nondistended, TTP around LLQ wound, mild induration around wound with associated erythema, no fluctuance appreciated, packing in place removed at bedside, penrose drain in place with nylon stitch, no active drainage or bleeding noted                          8.4    5.97  )-----------( 246      ( 28 Dec 2021 05:46 )             25.4   12-28    142  |  107  |  10  ----------------------------<  179<H>  4.0   |  29  |  0.59    Ca    8.5      28 Dec 2021 05:46    
Surgery Pre-op    Dx: Abdominal wall abscess  Procedure: Incision and drainage    Vital Signs Last 24 Hrs  T(C): 36.8 (27 Dec 2021 14:10), Max: 37 (27 Dec 2021 05:31)  T(F): 98.2 (27 Dec 2021 14:10), Max: 98.6 (27 Dec 2021 05:31)  HR: 73 (27 Dec 2021 14:10) (70 - 89)  BP: 112/67 (27 Dec 2021 14:10) (107/64 - 124/68)  BP(mean): --  RR: 18 (27 Dec 2021 14:10) (17 - 18)  SpO2: 98% (27 Dec 2021 14:10) (94% - 98%)    LABS:                        8.9    10.55 )-----------( 274      ( 27 Dec 2021 10:06 )             27.0              12    140  |  108  |  9   ----------------------------<  86  4.2   |  27  |  0.46<L>    Ca    8.5      27 Dec 2021 10:06    TPro  8.3  /  Alb  3.1<L>  /  TBili  0.5  /  DBili  x   /  AST  12  /  ALT  15  /  AlkPhos  68  12-    PT/INR - ( 26 Dec 2021 19:41 )   PT: 15.3 sec;   INR: 1.30 ratio      PTT - ( 26 Dec 2021 19:41 )  PTT:27.6 sec  Urinalysis Basic - ( 26 Dec 2021 18:26 )    Color: Yellow / Appearance: Clear / S.010 / pH: x  Gluc: x / Ketone: Negative  / Bili: Negative / Urobili: Negative   Blood: x / Protein: Negative / Nitrite: Negative   Leuk Esterase: Negative / RBC: x / WBC x   Sq Epi: x / Non Sq Epi: x / Bacteria: x    COVID-19 PCR (21 @ 14:56)    COVID-19 PCR: NotDetec    RADIOLOGY & ADDITIONAL STUDIES:  < from: CT Abdomen and Pelvis w/ IV Cont (21 @ 16:57) >  IMPRESSION:  *  6.3 x 3.9 cm left abdominal wall abscess, possibly at a prior trocar   site. The abscess involves the skin,subcutaneous fat, abdominal wall   musculature, and extends at least into the properitoneal space. Unclear   whether this goes deeper into the abdominal cavity, however it is likely   given it is following the trocar tract.    < end of copied text >

## 2021-12-29 NOTE — PROGRESS NOTE ADULT - ASSESSMENT
61 y.o. female with CT findings left abdominal wall abscess  CT findings: 6.3 x 3.9 cm left abdominal wall abscess, possibly at a prior trocar site. The abscess involves the skin, subcutaneous fat, abdominal wall musculature, and extends at least into the properitoneal space. Unclear whether this goes deeper into the abdominal cavity, however it is likely given it is following the trocar tract.  Afebrile, WBC wnl     - NPO   - IVF  - Serial Abdominal Exam  - pending AM labs, f/u  - IR consult  - ID consult  - analgesics/ antiemetics prn   - DVT ppx, OOB   - discussed and agreed with Dr. Friend 
61 yoF with L abdominal wall abscess likely 2/2 prior laparoscopic surgery    wound probed at bedside, packed; improved erythema and induration  swab sent for culture, pending result  afeb, vss    - continue abx  - local wound care  - dvt/gi ppx  
61y.o. Female with abdominal wall abscess    -OR 12/28/21  -NPO after midnight except PO meds  -IVF when NPO  -con't abx  -Chlorhexidine wipes  -DVT ppx
61F POD1 s/p incision and drainage of left sided abdominal wall abscess  -pain control  -packing removed, overlying gauze dressing placed  -dispo planning

## 2021-12-29 NOTE — DISCHARGE NOTE NURSING/CASE MANAGEMENT/SOCIAL WORK - NSDCPEFALRISK_GEN_ALL_CORE
For information on Fall & Injury Prevention, visit: [email or web address] OR  [email or web address] OR  https://www.cdc.gov/steadi/patient.html

## 2021-12-31 ENCOUNTER — LABORATORY RESULT (OUTPATIENT)
Age: 61
End: 2021-12-31

## 2021-12-31 LAB
CULTURE RESULTS: SIGNIFICANT CHANGE UP
CULTURE RESULTS: SIGNIFICANT CHANGE UP
SPECIMEN SOURCE: SIGNIFICANT CHANGE UP
SPECIMEN SOURCE: SIGNIFICANT CHANGE UP

## 2022-01-02 ENCOUNTER — TRANSCRIPTION ENCOUNTER (OUTPATIENT)
Age: 62
End: 2022-01-02

## 2022-01-02 LAB
CULTURE RESULTS: SIGNIFICANT CHANGE UP
SPECIMEN SOURCE: SIGNIFICANT CHANGE UP

## 2022-01-03 ENCOUNTER — RESULT REVIEW (OUTPATIENT)
Age: 62
End: 2022-01-03

## 2022-01-03 ENCOUNTER — OUTPATIENT (OUTPATIENT)
Dept: OUTPATIENT SERVICES | Facility: HOSPITAL | Age: 62
LOS: 1 days | End: 2022-01-03
Payer: MEDICAID

## 2022-01-03 ENCOUNTER — APPOINTMENT (OUTPATIENT)
Dept: SURGICAL ONCOLOGY | Facility: HOSPITAL | Age: 62
End: 2022-01-03

## 2022-01-03 VITALS
OXYGEN SATURATION: 100 % | TEMPERATURE: 98 F | SYSTOLIC BLOOD PRESSURE: 107 MMHG | RESPIRATION RATE: 18 BRPM | HEART RATE: 62 BPM | DIASTOLIC BLOOD PRESSURE: 61 MMHG

## 2022-01-03 VITALS
OXYGEN SATURATION: 97 % | RESPIRATION RATE: 16 BRPM | HEIGHT: 68 IN | DIASTOLIC BLOOD PRESSURE: 65 MMHG | HEART RATE: 60 BPM | WEIGHT: 149.91 LBS | TEMPERATURE: 99 F | SYSTOLIC BLOOD PRESSURE: 107 MMHG

## 2022-01-03 DIAGNOSIS — K66.0 PERITONEAL ADHESIONS (POSTPROCEDURAL) (POSTINFECTION): Chronic | ICD-10-CM

## 2022-01-03 DIAGNOSIS — C43.59 MALIGNANT MELANOMA OF OTHER PART OF TRUNK: ICD-10-CM

## 2022-01-03 DIAGNOSIS — Z90.49 ACQUIRED ABSENCE OF OTHER SPECIFIED PARTS OF DIGESTIVE TRACT: Chronic | ICD-10-CM

## 2022-01-03 DIAGNOSIS — Z98.890 OTHER SPECIFIED POSTPROCEDURAL STATES: Chronic | ICD-10-CM

## 2022-01-03 DIAGNOSIS — Z98.891 HISTORY OF UTERINE SCAR FROM PREVIOUS SURGERY: Chronic | ICD-10-CM

## 2022-01-03 DIAGNOSIS — Z90.710 ACQUIRED ABSENCE OF BOTH CERVIX AND UTERUS: Chronic | ICD-10-CM

## 2022-01-03 LAB
GLUCOSE BLDC GLUCOMTR-MCNC: 135 MG/DL — HIGH (ref 70–99)
GLUCOSE BLDC GLUCOMTR-MCNC: 174 MG/DL — HIGH (ref 70–99)

## 2022-01-03 PROCEDURE — 38900 IO MAP OF SENT LYMPH NODE: CPT | Mod: RT

## 2022-01-03 PROCEDURE — 88331 PATH CONSLTJ SURG 1 BLK 1SPC: CPT

## 2022-01-03 PROCEDURE — 38790 INJECT FOR LYMPHATIC X-RAY: CPT | Mod: 59

## 2022-01-03 PROCEDURE — 88341 IMHCHEM/IMCYTCHM EA ADD ANTB: CPT

## 2022-01-03 PROCEDURE — 14001 TIS TRNFR TRUNK 10.1-30SQCM: CPT

## 2022-01-03 PROCEDURE — 78195 LYMPH SYSTEM IMAGING: CPT | Mod: MG

## 2022-01-03 PROCEDURE — 88307 TISSUE EXAM BY PATHOLOGIST: CPT | Mod: 26

## 2022-01-03 PROCEDURE — 88342 IMHCHEM/IMCYTCHM 1ST ANTB: CPT | Mod: 26

## 2022-01-03 PROCEDURE — 78195 LYMPH SYSTEM IMAGING: CPT | Mod: 26,MG

## 2022-01-03 PROCEDURE — 21936 RESECT BACK TUM 5 CM/>: CPT

## 2022-01-03 PROCEDURE — G1004: CPT

## 2022-01-03 PROCEDURE — 88305 TISSUE EXAM BY PATHOLOGIST: CPT | Mod: 26

## 2022-01-03 PROCEDURE — 88331 PATH CONSLTJ SURG 1 BLK 1SPC: CPT | Mod: 26

## 2022-01-03 PROCEDURE — 82962 GLUCOSE BLOOD TEST: CPT

## 2022-01-03 PROCEDURE — 38740 REMOVE ARMPIT LYMPH NODES: CPT

## 2022-01-03 PROCEDURE — 38525 BIOPSY/REMOVAL LYMPH NODES: CPT | Mod: RT

## 2022-01-03 PROCEDURE — 14301 TIS TRNFR ANY 30.1-60 SQ CM: CPT

## 2022-01-03 PROCEDURE — 88305 TISSUE EXAM BY PATHOLOGIST: CPT

## 2022-01-03 PROCEDURE — 38900 IO MAP OF SENT LYMPH NODE: CPT

## 2022-01-03 PROCEDURE — 88341 IMHCHEM/IMCYTCHM EA ADD ANTB: CPT | Mod: 26

## 2022-01-03 PROCEDURE — A9541: CPT

## 2022-01-03 PROCEDURE — 88342 IMHCHEM/IMCYTCHM 1ST ANTB: CPT

## 2022-01-03 PROCEDURE — 88307 TISSUE EXAM BY PATHOLOGIST: CPT

## 2022-01-03 PROCEDURE — 38792 RA TRACER ID OF SENTINL NODE: CPT | Mod: 59

## 2022-01-03 DEVICE — CLIP APPLIER COVIDIEN SURGICLIP II 9.75" MEDIUM: Type: IMPLANTABLE DEVICE | Status: FUNCTIONAL

## 2022-01-03 DEVICE — CLIP APPLIER COVIDIEN SURGICLIP III 9" SM: Type: IMPLANTABLE DEVICE | Status: FUNCTIONAL

## 2022-01-03 DEVICE — CLIP APPLIER COVIDIEN SURGICLIP 13" LARGE: Type: IMPLANTABLE DEVICE | Status: FUNCTIONAL

## 2022-01-03 RX ORDER — SODIUM CHLORIDE 9 MG/ML
1000 INJECTION, SOLUTION INTRAVENOUS
Refills: 0 | Status: DISCONTINUED | OUTPATIENT
Start: 2022-01-03 | End: 2022-01-03

## 2022-01-03 RX ORDER — OXYCODONE AND ACETAMINOPHEN 5; 325 MG/1; MG/1
2 TABLET ORAL EVERY 4 HOURS
Refills: 0 | Status: DISCONTINUED | OUTPATIENT
Start: 2022-01-03 | End: 2022-01-03

## 2022-01-03 RX ORDER — FENTANYL CITRATE 50 UG/ML
25 INJECTION INTRAVENOUS
Refills: 0 | Status: DISCONTINUED | OUTPATIENT
Start: 2022-01-03 | End: 2022-01-03

## 2022-01-03 RX ORDER — FENTANYL CITRATE 50 UG/ML
50 INJECTION INTRAVENOUS
Refills: 0 | Status: DISCONTINUED | OUTPATIENT
Start: 2022-01-03 | End: 2022-01-03

## 2022-01-03 NOTE — PACU DISCHARGE NOTE - AIRWAY PATENCY:
Satisfactory Partial Purse String (Intermediate) Text: Given the location of the defect and the characteristics of the surrounding skin an intermediate purse string closure was deemed most appropriate.  Undermining was performed circumfirentially around the surgical defect.  A purse string suture was then placed and tightened. Wound tension only allowed a partial closure of the circular defect.

## 2022-01-03 NOTE — BRIEF OPERATIVE NOTE - OPERATION/FINDINGS
Right mid back paraspinal melanoma excision with 5x10cm elliptical incision.  Right axillary sentinal lymph node biopsy with radiotracer and methylene blue dye

## 2022-01-03 NOTE — ASU PATIENT PROFILE, ADULT - FALL HARM RISK - HARM RISK INTERVENTIONS

## 2022-01-03 NOTE — BRIEF OPERATIVE NOTE - NSICDXBRIEFPROCEDURE_GEN_ALL_CORE_FT
PROCEDURES:  Excision, melanoma, back, with sentinel lymph node biopsy 03-Jan-2022 11:50:25  Aye Brown

## 2022-01-03 NOTE — ASU DISCHARGE PLAN (ADULT/PEDIATRIC) - CARE PROVIDER_API CALL
Angel Johnson)  Surgery  [address], [place] [address]  Phone: [phone]  Fax: [phone]  Follow Up Time: 1 week

## 2022-01-03 NOTE — ASU DISCHARGE PLAN (ADULT/PEDIATRIC) - NS MD DC FALL RISK RISK
For information on Fall & Injury Prevention, visit: [email or web address] OR  [email or web address] OR  https://www.cdc.gov/steadi/patient.html

## 2022-01-04 RX ORDER — HYDROXYZINE HCL 10 MG
1 TABLET ORAL
Qty: 0 | Refills: 0 | DISCHARGE

## 2022-01-04 RX ORDER — METFORMIN HYDROCHLORIDE 850 MG/1
1 TABLET ORAL
Qty: 0 | Refills: 0 | DISCHARGE

## 2022-01-11 ENCOUNTER — APPOINTMENT (OUTPATIENT)
Dept: SURGERY | Facility: CLINIC | Age: 62
End: 2022-01-11
Payer: MEDICAID

## 2022-01-11 VITALS — TEMPERATURE: 90.5 F

## 2022-01-11 VITALS
HEIGHT: 68 IN | DIASTOLIC BLOOD PRESSURE: 74 MMHG | SYSTOLIC BLOOD PRESSURE: 112 MMHG | BODY MASS INDEX: 21.37 KG/M2 | WEIGHT: 141 LBS | HEART RATE: 65 BPM

## 2022-01-11 DIAGNOSIS — K56.609 UNSPECIFIED INTESTINAL OBSTRUCTION, UNSPECIFIED AS TO PARTIAL VERSUS COMPLETE OBSTRUCTION: ICD-10-CM

## 2022-01-11 PROCEDURE — 99024 POSTOP FOLLOW-UP VISIT: CPT

## 2022-01-13 LAB — SURGICAL PATHOLOGY STUDY: SIGNIFICANT CHANGE UP

## 2022-01-25 ENCOUNTER — APPOINTMENT (OUTPATIENT)
Dept: SURGICAL ONCOLOGY | Facility: CLINIC | Age: 62
End: 2022-01-25
Payer: MEDICAID

## 2022-01-25 VITALS
DIASTOLIC BLOOD PRESSURE: 64 MMHG | WEIGHT: 141 LBS | HEIGHT: 68 IN | HEART RATE: 58 BPM | OXYGEN SATURATION: 99 % | BODY MASS INDEX: 21.37 KG/M2 | SYSTOLIC BLOOD PRESSURE: 101 MMHG

## 2022-01-25 VITALS — TEMPERATURE: 96.7 F

## 2022-01-25 PROCEDURE — 99024 POSTOP FOLLOW-UP VISIT: CPT

## 2022-01-25 NOTE — ASSESSMENT
[FreeTextEntry1] : IMP:\par \par 60 y/o female with newly diagnosed melanoma.\par \par Back Mid Medial Right Shave Biopsy 10/20/21: Melanoma measuring at least 0.9 mm in thickness(the lesion extends focally to the base of the specimen), ulceration absent, mitotic index 0 mm/2, no LVI, at least pT1a.\par \par Back lower right shave biopsy 10/20/21: lentiginous compound melanocytic nevus. \par \par Excsion shows negative margins but isolated melanotic cells in the sentinel lymph node\par \par 3 right breast masses\par \par PLAN:\par PET scan\par Right breast sono-guided biopsies\par Evaluation by Dr. Cota for adjuvant therapy\par

## 2022-01-25 NOTE — CONSULT LETTER
[Dear  ___] : Dear  [unfilled], [Courtesy Letter:] : I had the pleasure of seeing your patient, [unfilled], in my office today. [Please see my note below.] : Please see my note below. [Consult Closing:] : Thank you very much for allowing me to participate in the care of this patient.  If you have any questions, please do not hesitate to contact me. [Sincerely,] : Sincerely, [FreeTextEntry1] : She will contacting you for adjuvant therapy. [FreeTextEntry3] : Angel Johnson MD FACS\par Chief of Surgical Oncology\par \par

## 2022-01-25 NOTE — HISTORY OF PRESENT ILLNESS
[de-identified] : Ondina is a 61 year old female who presents today for an post op s/p right back melanoma excision and SNLB on 1/3/22. Final Path: \par 1. Right sentinel node axilla, biopsy: One lymph node, showing rare Melan- A signals\par 2. Skin and soft tissue, back, excision: \par - Residual melanoma, 0.9 mm in thickness, margins negative \par - Scar secondary to previous procedure \par - Tiny junctional melanocytic nevus, at inferior tip \par  \par \par Back Mid Medial Right Shave Biopsy 10/20/21: Melanoma measuring at least 0.9 mm in thickness(the lesion extends focally to the base of the specimen), ulceration absent, mitotic index 0 mm/2, no LVI, at least pT1a.\par \par Back lower right shave biopsy 10/20/21: lentiginous compound melanotocytic nevu\par \par \par \par Referred by: Ricardo Lopez MD.

## 2022-01-26 PROBLEM — C43.9 MALIGNANT MELANOMA OF SKIN, UNSPECIFIED: Chronic | Status: ACTIVE | Noted: 2021-12-20

## 2022-01-26 PROBLEM — I10 ESSENTIAL (PRIMARY) HYPERTENSION: Chronic | Status: ACTIVE | Noted: 2021-12-20

## 2022-01-26 PROBLEM — F41.9 ANXIETY DISORDER, UNSPECIFIED: Chronic | Status: ACTIVE | Noted: 2021-12-20

## 2022-01-26 PROBLEM — K56.609 UNSPECIFIED INTESTINAL OBSTRUCTION, UNSPECIFIED AS TO PARTIAL VERSUS COMPLETE OBSTRUCTION: Chronic | Status: ACTIVE | Noted: 2021-12-26

## 2022-01-26 PROBLEM — K21.9 GASTRO-ESOPHAGEAL REFLUX DISEASE WITHOUT ESOPHAGITIS: Chronic | Status: ACTIVE | Noted: 2021-12-20

## 2022-01-26 PROBLEM — E11.9 TYPE 2 DIABETES MELLITUS WITHOUT COMPLICATIONS: Chronic | Status: ACTIVE | Noted: 2021-12-20

## 2022-02-07 NOTE — PHYSICAL EXAM
[Normal Breath Sounds] : Normal breath sounds [Normal Heart Sounds] : normal heart sounds [Normal Rate and Rhythm] : normal rate and rhythm [No Rash or Lesion] : No rash or lesion [Alert] : alert [Oriented to Person] : oriented to person [Oriented to Place] : oriented to place [Oriented to Time] : oriented to time [Calm] : calm [de-identified] : The patient is alert, well-groomed  [de-identified] : Incision sites are healing well, LUQ post I and D that  is healing well  [de-identified] : full range of motion and no deformities appreciated.

## 2022-02-07 NOTE — REASON FOR VISIT
[Post Op: _________] : a [unfilled] post op visit [FreeTextEntry1] : s/p Laparoscopic lysis of adhesions over one hour on 11/26/2021

## 2022-02-07 NOTE — HISTORY OF PRESENT ILLNESS
[de-identified] : JOSÉ ANTONIO RIOS is a 61 year old female who presents in the office for postop up visit. She underwent a Laparoscopic lysis of adhesions over one hour on 11/26/2021.CT scan demonstrated abdominal wall abscess. Patient underwent an Incision and drainage of abdominal wall abscess on 12/28/2021. Patient is doing well, incision sites are healing well,  LUQ incision site s/p I and D and had she had a Penrose drain, that spontaneously discontinued.

## 2022-02-07 NOTE — REVIEW OF SYSTEMS
[Negative] : Heme/Lymph [Fever] : no fever [Chills] : no chills [Feeling Poorly] : not feeling poorly [Feeling Tired] : not feeling tired

## 2022-02-07 NOTE — ASSESSMENT
[FreeTextEntry1] : JOSÉ ANTONIO RIOS is a 61 year old female who underwent a Laparoscopic lysis of adhesions over one hour on 11/26/2021 and  Incision and drainage of abdominal wall abscess on 12/28/2021. \par \par \par Patient is doing well, incision sites are healing well,  LUQ incision site s/p I and D and had she had a Penrose drain, that spontaneously discontinued.  \par \par Patient's questions and concerns addressed to patient's satisfaction. \par \par She will follow up with Dr Johnson for Melanoma of the right upper back.\par

## 2022-04-26 ENCOUNTER — APPOINTMENT (OUTPATIENT)
Dept: SURGICAL ONCOLOGY | Facility: CLINIC | Age: 62
End: 2022-04-26
Payer: MEDICAID

## 2022-05-03 ENCOUNTER — APPOINTMENT (OUTPATIENT)
Dept: SURGICAL ONCOLOGY | Facility: CLINIC | Age: 62
End: 2022-05-03
Payer: MEDICAID

## 2022-05-03 VITALS
DIASTOLIC BLOOD PRESSURE: 75 MMHG | HEART RATE: 72 BPM | HEIGHT: 68 IN | OXYGEN SATURATION: 98 % | BODY MASS INDEX: 21.37 KG/M2 | SYSTOLIC BLOOD PRESSURE: 138 MMHG | WEIGHT: 141 LBS

## 2022-05-03 VITALS — TEMPERATURE: 97.1 F

## 2022-05-03 PROCEDURE — 99214 OFFICE O/P EST MOD 30 MIN: CPT

## 2022-05-03 NOTE — ASSESSMENT
[FreeTextEntry1] : IMP:\par S/p right back melanoma excision and SNLB on 1/3/22. Final Path: \par 1. Right sentinel node axilla, biopsy: One lymph node, showing rare Melan- A signals\par 2. Skin and soft tissue, back, excision: \par - Residual melanoma, 0.9 mm in thickness, margins negative \par - Scar secondary to previous procedure \par - Tiny junctional melanocytic nevus, at inferior tip \par \par 3 right breast masses on M/S 11/2021 (12:00, 3:00, 10:00), pending biopsy\par \par PLAN:\par Right breast sono-guided biopsies\par Evaluation by Dr. Cota for adjuvant therapy\par RTO 3 months\par

## 2022-05-03 NOTE — HISTORY OF PRESENT ILLNESS
[de-identified] : Ondina is a 62 year old female who presents today for a 3 month follow up visit.  \par \par Back Mid Medial Right Shave Biopsy 10/20/21: Melanoma measuring at least 0.9 mm in thickness(the lesion extends focally to the base of the specimen), ulceration absent, mitotic index 0 mm/2, no LVI, at least pT1a.\par \par Back lower right shave biopsy 10/20/21: lentiginous compound melanocytic nevus\par \par She is  s/p right back melanoma excision and SNLB on 1/3/22. Final Path: \par 1. Right sentinel node axilla, biopsy: One lymph node, showing rare Melan- A signals\par 2. Skin and soft tissue, back, excision: \par - Residual melanoma, 0.9 mm in thickness, margins negative \par - Scar secondary to previous procedure \par - Tiny junctional melanocytic nevus, at inferior tip \par  \par PET/CT on 2/23/22 revealed:\par 1) Hypermetabolic activity in soft tissue nodularity in the skin and subcutaneous tissues of the right upper back. these findings are nonspecific and may be postsurgical in etiology, however, malignancy cannot be excluded. Correlation with direct inspection would be helpful in further evaluation. \par 2) Mildly hypermetabolic borderline enlarged right axillary lymph node. The imaging appearance of this lymph node is nonspecific and may be reactive in etiology, however, malignancy cannot be excluded. \par 3) Small hypermetabolic mesenteric lymph nodes. These findings are suspicious for malignancy. \par 4) 0.4 cm right upper lobe pulmonary nodule, which is below the size resolution of PET imaging. Follow up chest CT in 6 months may be helpful in further evaluation. \par 5) Diverticular disease. \par \par Bilateral breast imaging performed 11/24/21 revealed multifocal nodular asymmetry with microcalcifications in the right breast.  There are associated masses with posterior shadowing and microcalcifications in the right breast on sonogram, corresponding to the patient's palpable abnormality.  Ultrasound-guided biopsies of 3 sites recommended (2.5 cm mass at 12:00, 1 cm mass at 3:00, and 1.3 cm mass at 10:00) BIRADS 5.\par \par Breast biopsies have not been done\par \par \par Referred by: Ricardo Lopez MD.

## 2022-05-03 NOTE — CONSULT LETTER
[Dear  ___] : Dear  [unfilled], [Courtesy Letter:] : I had the pleasure of seeing your patient, [unfilled], in my office today. [Please see my note below.] : Please see my note below. [Consult Closing:] : Thank you very much for allowing me to participate in the care of this patient.  If you have any questions, please do not hesitate to contact me. [Sincerely,] : Sincerely, [FreeTextEntry1] : I will keep you informed of the breast biopsy results and my follow-up. [FreeTextEntry3] : Angel Johnson MD FACS\par Chief of Surgical Oncology\par \par

## 2022-05-03 NOTE — PHYSICAL EXAM
[Normal] : supple, no neck mass and thyroid not enlarged [Normal Neck Lymph Nodes] : normal neck lymph nodes  [Normal Supraclavicular Lymph Nodes] : normal supraclavicular lymph nodes [Normal Axillary Lymph Nodes] : normal axillary lymph nodes [Normal] : oriented to person, place and time, with appropriate affect [de-identified] : fibrocystic changes but no masses [de-identified] : right upper back excision site healing nicely

## 2022-08-16 ENCOUNTER — APPOINTMENT (OUTPATIENT)
Dept: SURGICAL ONCOLOGY | Facility: CLINIC | Age: 62
End: 2022-08-16

## 2022-10-04 NOTE — DISCHARGE NOTE PROVIDER - YES NO FOR MLM POSITIVE OR NEGATIVE COVID RESULT
Subjective:      Patient ID: Jaime Chand is a 67 y.o. female    Follow up for hip abscess   Ankle pain x 1 yr  Facial pain f/u  HPI  Pt presents for follow up regarding right hip abscess. Ancef completed and PICC line removed. Pt claims persistent sharp pain and bleeding from wound(no pus). Left ankle bleed and swelling x 1 yr. Persistent blistering and bleeding around the left inner ankle. Upper abd ache x 1 month. Rated 3/10, nonradiating. No known precipitating or relieving factors. No bloody or black stools. No NVDC. CT abd 3 months ago showed liver cirrhosis and small ascites. Right facial pain radiating to the scalp pain remains persistent. Sharp shooting rated 10/10. Temporal artery biopsy negative for GCA. CT facial bones negative. Hx trigeminal neuralgia s/p surgery. Past Medical History:   Diagnosis Date    Anxiety     DM (diabetes mellitus) ([facility] [address] 4/14/2015    Hepatitis B infection     Hyperlipidemia     Hypothyroidism     Insomnia     Leg pain 4/14/2015    Post herpetic neuralgia     Unspecified sleep apnea     after seeing [facility] they state she is does not have sleep apnea. Not using cpap     Past Surgical History:   Procedure Laterality Date    ANKLE FRACTURE SURGERY  09/2016    DR ANDRADE  LT    BIOPSY / LIGATION TEMPORAL ARTERY Right 4/26/2019    RIGHT TEMPORAL ARTERY BIOPSY performed by Ligia Rutledge MD at [address] [place] [address],  [id number]  2015    surgery for transgeminal neuraglia.   Insertion of sponge for chronic pain    COLONOSCOPY  3/31/14    DR Sandra Parada    COLONOSCOPY N/A 2/2/2021    COLONOSCOPY DIAGNOSTIC performed by Francine Kelley MD at [address] [facility] (CERVIX STATUS UNKNOWN)      PARTIAL    LYMPHADENECTOMY      OTHER SURGICAL HISTORY Right     Trigeminal Neuralgia    HI COLON CA SCRN NOT  W [address] [place] [address] IND N/A 7/24/2017    COLONOSCOPY performed by Francine Kelley MD at [facility] 2/2/2021    EGD ESOPHAGOGASTRODUODENOSCOPY performed by Jackson Garcia MD at [address] History    Marital status:      Spouse name: Not on file    Number of children: 2    Years of education: Not on file    Highest education level: 12th grade   Occupational History    Occupation: retired    Tobacco Use    Smoking status: Former     Packs/day: 1.00     Years: 48.00     Pack years: 48.00     Types: Cigarettes     Quit date: 2013     Years since quittin.2    Smokeless tobacco: Never   Vaping Use    Vaping Use: Every day    Substances: Nicotine, Flavoring, low  dose    Substance and Sexual Activity    Alcohol use: No     Comment: very rarely    Drug use: No    Sexual activity: Not Currently     Partners: Male   Other Topics Concern    Not on file   Social History Narrative    Lives alone    Has stairs in home needs to go up/down.     2 children that help if needed      Social Determinants of Health     Financial Resource Strain: Low Risk     Difficulty of Paying Living Expenses: Not hard at all   Food Insecurity: No Food Insecurity    Worried About Running Out of Food in the Last Year: Never true    Ran Out of Food in the Last Year: Never true   Transportation Needs: Not on file   Physical Activity: Not on file   Stress: Not on file   Social Connections: Not on file   Intimate Partner Violence: Not on file   Housing Stability: Not on file     Family History   Problem Relation Age of Onset    Stroke Mother     High Cholesterol Mother     Cancer Mother     Colon Cancer Neg Hx     Celiac Disease Neg Hx     Crohn's Disease Neg Hx      Allergies:  Cefdinir and Metformin and related  Patient Active Problem List   Diagnosis    Hepatitis B infection    Sleep apnea    Anxiety    Insomnia    Trigeminal neuralgia    Hyperlipidemia    Hypothyroidism    Dyspnea    DM (diabetes mellitus) ([facility]    Leg pain    Depression    Frequent headaches    Major depressive disorder, recurrent, in full remission ([facility]    Cherry angioma    Diffuse photodamage of skin    Dilated pore of Solomon    GERD (gastroesophageal reflux disease)    Hidradenitis suppurativa    Lentigines    Multiple benign nevi    Poikiloderma    Seborrheic keratosis    Morbidly obese (HCC)    Chronic gastritis without bleeding    Polyp of sigmoid colon    Esophageal varices without bleeding, unspecified esophageal varices type (HCC)    Chronic obstructive pulmonary disease, unspecified COPD type ([facility]    Anemia with low platelet count ([facility])    Chronic renal disease, stage III ([facility] [[id number]]     Current Outpatient Medications on File Prior to Visit   Medication Sig Dispense Refill    vitamin D3 (CHOLECALCIFEROL) 25 MCG (1000 UT) TABS tablet Take 1 tablet by mouth in the morning.  90 tablet 3    ondansetron (ZOFRAN) 4 MG tablet Take 1 tablet by mouth 3 times daily as needed for Nausea or Vomiting 15 tablet 0    citalopram (CELEXA) 20 MG tablet TAKE 1 TABLET DAILY (NEED APPOINTMENT FOR FURTHER REFILLS) 90 tablet 1    levothyroxine (SYNTHROID) 150 MCG tablet TAKE 1 TABLET DAILY (NEED APPOINTMENT FOR FURTHER REFILLS) 90 tablet 3    famotidine (PEPCID) 20 MG tablet TAKE 1 TABLET TWICE A DAY (NEED APPOINTMENT FOR FURTHER REFILLS) 180 tablet 3    JANUVIA 100 MG tablet TAKE 1 TABLET DAILY 90 tablet 3    ipratropium-albuterol (DUONEB) 0.5-2.5 (3) MG/3ML SOLN nebulizer solution       fluticasone-umeclidin-vilant (TRELEGY ELLIPTA) 100-62.5-25 MCG/INH AEPB       tiZANidine (ZANAFLEX) 4 MG tablet Take 1 tablet by mouth every 8 hours as needed (muscle back pain) 30 tablet 1    Continuous Blood Gluc  (DEXCOM G6 ) MARIELA Use daily for blood sugar readings 1 Device 1    Continuous Blood Gluc Sensor (DEXCOM G6 SENSOR) MISC Use daily for blood sugar readings 1 each 3    albuterol sulfate  (90 Base) MCG/ACT inhaler Inhale 2 puffs into the lungs every 6 hours as needed for Wheezing 1 Inhaler 0    Blood Glucose Monitoring Suppl (TRUE METRIX METER) w/Device KIT use to test sugar  0    blood glucose test strips (TRUETEST TEST) strip Test blood glucose fasting and 1 hour post lunch and dinner 100 each 5    Lancets MISC Inject 1 each into the skin 3 times daily Use early in the morning fasting and 1 hour post lunch and dinner 100 each 5    aspirin 81 MG EC tablet Take 1 tablet by mouth daily 90 tablet 3     Current Facility-Administered Medications on File Prior to Visit   Medication Dose Route Frequency Provider Last Rate Last Admin    methylPREDNISolone acetate (DEPO-MEDROL) injection 60 mg  60 mg IntraMUSCular Once Taran Asrhaf MD           Review of Systems   Constitutional:  Negative for chills, diaphoresis, fatigue and fever. HENT:  Negative for congestion, ear discharge, ear pain, rhinorrhea, sinus pressure, sinus pain, sneezing and sore throat. Respiratory:  Negative for cough, shortness of breath and wheezing. Cardiovascular:  Negative for chest pain. Gastrointestinal:  Positive for abdominal pain. Negative for diarrhea, nausea and vomiting. Endocrine: Negative for cold intolerance and heat intolerance. Genitourinary:  Negative for dysuria and frequency. Skin:  Positive for color change and wound. Neurological:  Negative for dizziness and light-headedness. Objective:   /68   Pulse 98   Temp 97.3 °F (36.3 °C)   Resp 18   Ht 5' 5\" (1.651 m)   Wt 198 lb 14.4 oz (90.2 kg)   LMP 02/28/1982   SpO2 99%   BMI 33.10 kg/m²     Physical Exam  Constitutional:       General: She is not in acute distress. Appearance: She is not diaphoretic. Cardiovascular:      Rate and Rhythm: Normal rate and regular rhythm. Pulses: Normal pulses. Heart sounds: Normal heart sounds, S1 normal and S2 normal.   Pulmonary:      Effort: Pulmonary effort is normal. No respiratory distress. Breath sounds: Normal breath sounds. No wheezing or rales. Chest:      Chest wall: No tenderness.    Abdominal:      General: Bowel sounds are normal. Distension: left iliac fossa and periumbilical TTP. Tenderness: There is abdominal tenderness. Skin:     Comments: Right lateral hip hyperpigmentation and near -healed scar, no bleeding     Left medial lower leg erythema with 2 stage 2 ulcers with health granulation tissue. Surrounding edema and TTP, no warmth   Neurological:      Mental Status: She is alert. Assessment:       Diagnosis Orders   1. Pain of upper abdomen  dicyclomine (BENTYL) 10 MG capsule    CT ABDOMEN PELVIS W IV CONTRAST Additional Contrast? Oral    Sharon Ramires MD, [facility]      2. Venous insufficiency of left leg  Ambulatory referral to Wound Clinic      3. Health care maintenance  Influenza, FLUAD, (age 72 y+), IM, Preservative Free, 0.5 mL      4. Right facial pain  ketorolac (TORADOL) injection 60 mg    ketorolac (TORADOL) 10 MG tablet    External Referral to Neurology    hx trigeminal neuralgia       5.  Abscess of right hip      healed         Plan:      Orders Placed This Encounter   Procedures    CT ABDOMEN PELVIS W IV CONTRAST Additional Contrast? Oral     Standing Status:   Future     Standing Expiration Date:   10/4/2023     Order Specific Question:   Additional Contrast?     Answer:   Oral     Order Specific Question:   STAT Creatinine as needed:     Answer:   No    Influenza, FLUAD, (age 72 y+), IM, Preservative Free, 0.5 mL    Ambulatory referral to Wound Clinic     Referral Priority:   Routine     Referral Type:   Eval and Treat     Referral Reason:   Specialty Services Required     Number of Visits Requested:   [address] Reginal Bud, MD, Hepatology, [place]     Referral Priority:   Routine     Referral Type:   Eval and Treat     Referral Reason:   Specialty Services Required     Referred to Provider:   Della Barnhart MD     Requested Specialty:   Hepatology     Number of Visits Requested:   1    External Referral to Neurology     Referral Priority:   Routine     Referral Type: Eval and Treat     Referral Reason:   Specialty Services Required     Referred to Provider:   Delfino Adame MD     Requested Specialty:   Neurology     Number of Visits Requested:   1   Leg elevation and compression stocking recommended. At least 41 minutes spent on H and P and treatment outlining. All questions answered. Return in about 1 month (around 11/4/2022) for follow up, with PCP. ,

## 2023-01-23 PROBLEM — N64.59 ABNORMAL BREAST FINDING: Status: ACTIVE | Noted: 2022-05-03

## 2023-01-24 ENCOUNTER — APPOINTMENT (OUTPATIENT)
Dept: SURGICAL ONCOLOGY | Facility: CLINIC | Age: 63
End: 2023-01-24
Payer: MEDICAID

## 2023-01-24 VITALS
SYSTOLIC BLOOD PRESSURE: 152 MMHG | WEIGHT: 167 LBS | HEIGHT: 68 IN | DIASTOLIC BLOOD PRESSURE: 89 MMHG | HEART RATE: 68 BPM | BODY MASS INDEX: 25.31 KG/M2

## 2023-01-24 DIAGNOSIS — N64.59 OTHER SIGNS AND SYMPTOMS IN BREAST: ICD-10-CM

## 2023-01-24 PROCEDURE — 99214 OFFICE O/P EST MOD 30 MIN: CPT

## 2023-01-24 NOTE — ASSESSMENT
[FreeTextEntry1] : IMP:\par S/p right back melanoma excision and SNLB on 1/3/22. Final Path: \par 1. Right sentinel node axilla, biopsy: One lymph node, showing rare Melan- A signals\par 2. Skin and soft tissue, back, excision: \par - Residual melanoma, 0.9 mm in thickness, margins negative \par - Tiny junctional melanocytic nevus, at inferior tip \par \par 3 right breast masses on M/S 11/2021 (12:00, 3:00, 10:00), pending biopsy\par \par PLAN:\par \par biloateral mamogram and sonogram now\par on Nivolumab w/ Dr. Armstrong \par RTO may, 2023 with PET scan\par

## 2023-01-24 NOTE — PHYSICAL EXAM
[de-identified] : fibrocystic changes but no masses [de-identified] : right upper back excision site healing nicely

## 2023-01-24 NOTE — HISTORY OF PRESENT ILLNESS
[de-identified] : Ondina is a 62 year old female who presents today for a follow up visit\par \par Back Mid Medial Right Shave Biopsy 10/20/21: Melanoma measuring at least 0.9 mm in thickness(the lesion extends focally to the base of the specimen), ulceration absent, mitotic index 0 mm/2, no LVI, at least pT1a.\par \par Back lower right shave biopsy 10/20/21: lentiginous compound melanocytic nevus\par \par She is  s/p right back melanoma excision and SNLB on 1/3/22. Final Path: \par 1. Right sentinel node axilla, biopsy: One lymph node, showing rare Melan- A signals\par 2. Skin and soft tissue, back, excision: \par - Residual melanoma, 0.9 mm in thickness, margins negative \par - Scar secondary to previous procedure \par - Tiny junctional melanocytic nevus, at inferior tip \par  \par PET/CT on 2/23/22 revealed:\par 1) Hypermetabolic activity in soft tissue nodularity in the skin and subcutaneous tissues of the right upper back. these findings are nonspecific and may be postsurgical in etiology, however, malignancy cannot be excluded. Correlation with direct inspection would be helpful in further evaluation. \par 2) Mildly hypermetabolic borderline enlarged right axillary lymph node. The imaging appearance of this lymph node is nonspecific and may be reactive in etiology, however, malignancy cannot be excluded. \par 3) Small hypermetabolic mesenteric lymph nodes. These findings are suspicious for malignancy. \par 4) 0.4 cm right upper lobe pulmonary nodule, which is below the size resolution of PET imaging. Follow up chest CT in 6 months may be helpful in further evaluation. \par 5) Diverticular disease. \par \par Bilateral breast imaging performed 11/24/21 revealed multifocal nodular asymmetry with microcalcifications in the right breast.  There are associated masses with posterior shadowing and microcalcifications in the right breast on sonogram, corresponding to the patient's palpable abnormality.  Ultrasound-guided biopsies of 3 sites recommended (2.5 cm mass at 12:00, 1 cm mass at 3:00, and 1.3 cm mass at 10:00) BIRADS 5.\par -- were these ever biopsied?\par \par \par \par Referred by: Ricardo Lopez MD.

## 2023-03-30 NOTE — PATIENT PROFILE ADULT - NSPROPOAPRESSUREINJURY_GEN_A_NUR
Patient instructed to return to the ED or call 911 if patient experiences SI, HI, hopelessness, worsening of symptoms or has any other concerns.  no

## 2023-05-23 PROBLEM — C43.59 MALIGNANT MELANOMA OF BACK: Status: ACTIVE | Noted: 2021-11-30

## 2023-05-23 NOTE — ASSESSMENT
[FreeTextEntry1] : IMP:\par S/p right back melanoma excision and SNLB on 1/3/22. Final Path: \par 1. Right sentinel node axilla, biopsy: One lymph node, showing rare Melan- A signals\par 2. Skin and soft tissue, back, excision: \par - Residual melanoma, 0.9 mm in thickness, margins negative \par - Tiny junctional melanocytic nevus, at inferior tip \par \par 3 right breast masses on M/S 11/2021 (12:00, 3:00, 10:00), pending biopsy\par \par PLAN:\par \par biloateral mamogram and sonogram now\par on Nivolumab w/ Dr. Armstrong \par RTO

## 2023-05-23 NOTE — HISTORY OF PRESENT ILLNESS
[de-identified] : Ondina is a 63 year old female who presents today for a follow up visit\par \par Back Mid Medial Right Shave Biopsy 10/20/21: Melanoma measuring at least 0.9 mm in thickness(the lesion extends focally to the base of the specimen), ulceration absent, mitotic index 0 mm/2, no LVI, at least pT1a.\par \par Back lower right shave biopsy 10/20/21: lentiginous compound melanocytic nevus\par \par She is  s/p right back melanoma excision and SNLB on 1/3/22. Final Path: \par 1. Right sentinel node axilla, biopsy: One lymph node, showing rare Melan- A signals\par 2. Skin and soft tissue, back, excision: \par - Residual melanoma, 0.9 mm in thickness, margins negative \par - Scar secondary to previous procedure \par - Tiny junctional melanocytic nevus, at inferior tip \par  \par PET/CT on 2/23/22 revealed:\par 1) Hypermetabolic activity in soft tissue nodularity in the skin and subcutaneous tissues of the right upper back. these findings are nonspecific and may be postsurgical in etiology, however, malignancy cannot be excluded. Correlation with direct inspection would be helpful in further evaluation. \par 2) Mildly hypermetabolic borderline enlarged right axillary lymph node. The imaging appearance of this lymph node is nonspecific and may be reactive in etiology, however, malignancy cannot be excluded. \par 3) Small hypermetabolic mesenteric lymph nodes. These findings are suspicious for malignancy. \par 4) 0.4 cm right upper lobe pulmonary nodule, which is below the size resolution of PET imaging. Follow up chest CT in 6 months may be helpful in further evaluation. \par 5) Diverticular disease. \par \par Bilateral breast imaging performed 11/24/21 revealed multifocal nodular asymmetry with microcalcifications in the right breast.  There are associated masses with posterior shadowing and microcalcifications in the right breast on sonogram, corresponding to the patient's palpable abnormality.  Ultrasound-guided biopsies of 3 sites recommended (2.5 cm mass at 12:00, 1 cm mass at 3:00, and 1.3 cm mass at 10:00) BIRADS 5.\par -- were these ever biopsied?\par \par \par PET/CT 2/20/23: Interval resolution of previously seen hypermetabolic nodular infiltration adjacent to the right of midline mid to upper back resection bed and moderately hypermetabolic lymph nodes in the small bowel mesentery. Stable appearance of a mildly hypermetabolic benign- appearing lymph node in the right axilla. No newly suspicious hypermetabolic findings in the rest of the body. Otherwise stable PET/CT evaluation of the rest of the soft tissue and osseous structures. \par \par \par Referred by: Ricardo Lopez MD.

## 2023-05-30 ENCOUNTER — APPOINTMENT (OUTPATIENT)
Dept: SURGICAL ONCOLOGY | Facility: CLINIC | Age: 63
End: 2023-05-30

## 2023-05-30 DIAGNOSIS — C43.59 MALIGNANT MELANOMA OF OTHER PART OF TRUNK: ICD-10-CM

## 2024-06-19 NOTE — ASU PATIENT PROFILE, ADULT - AS SC BRADEN SENSORY
Seizure Safety Guide  - Do not climb ladders work on rooftops or operate heavy machinery.   - Take showers rather than baths. Make sure tub drains well. Leave the door unlocked.   - If you do take a bath, leave the bathroom door unlocked and make sure someone is aware that you are taking a bath. Have them check on you periodically.   - No swimming (including hot tubs/Jacuzzi) alone. Make sure that there is somebody big enough who would be able to rescue you if you had a seizure in one of these settings.   - Do not use the stove top without anyone present.   - Do not stand near camp fires or do any other activities where you would be injured if you were to have a seizure    - According to [place] state law, because you have had a seizure with loss of awareness you are restricted from driving until you are seizure free for 3 months.  [place] is a non-reporting state.       Check your blood pressure  and if ist less then 110 then do not take your lisinopril      
(4) no impairment

## 2024-11-26 ENCOUNTER — APPOINTMENT (OUTPATIENT)
Dept: SURGICAL ONCOLOGY | Facility: CLINIC | Age: 64
End: 2024-11-26
Payer: MEDICARE

## 2024-11-26 VITALS
HEIGHT: 68 IN | HEART RATE: 75 BPM | WEIGHT: 174 LBS | BODY MASS INDEX: 26.37 KG/M2 | OXYGEN SATURATION: 95 % | DIASTOLIC BLOOD PRESSURE: 83 MMHG | SYSTOLIC BLOOD PRESSURE: 141 MMHG | TEMPERATURE: 98 F

## 2024-11-26 DIAGNOSIS — C43.59 MALIGNANT MELANOMA OF OTHER PART OF TRUNK: ICD-10-CM

## 2024-11-26 PROCEDURE — 99204 OFFICE O/P NEW MOD 45 MIN: CPT

## 2025-02-11 ENCOUNTER — APPOINTMENT (OUTPATIENT)
Dept: SURGICAL ONCOLOGY | Facility: CLINIC | Age: 65
End: 2025-02-11
Payer: MEDICARE

## 2025-02-11 VITALS
HEART RATE: 88 BPM | WEIGHT: 174 LBS | HEIGHT: 68 IN | OXYGEN SATURATION: 97 % | DIASTOLIC BLOOD PRESSURE: 75 MMHG | SYSTOLIC BLOOD PRESSURE: 139 MMHG | BODY MASS INDEX: 26.37 KG/M2 | RESPIRATION RATE: 16 BRPM

## 2025-02-11 DIAGNOSIS — C43.59 MALIGNANT MELANOMA OF OTHER PART OF TRUNK: ICD-10-CM

## 2025-02-11 PROCEDURE — 99214 OFFICE O/P EST MOD 30 MIN: CPT

## 2025-08-12 ENCOUNTER — APPOINTMENT (OUTPATIENT)
Dept: SURGICAL ONCOLOGY | Facility: CLINIC | Age: 65
End: 2025-08-12

## (undated) DEVICE — SUT POLYSORB 3-0 30" V-20 UNDYED

## (undated) DEVICE — DRAIN JACKSON PRATT 10MM FLAT 3/4 NO TROCAR

## (undated) DEVICE — SUT SOFSILK 2-0 18" V-20

## (undated) DEVICE — DRSG STOCKINETTE TUBULAR COTTON 2PLY 6X60"

## (undated) DEVICE — DRAIN RESERVOIR FOR JACKSON PRATT 100CC CARDINAL

## (undated) DEVICE — DRAPE HALF SHEET 40X57"

## (undated) DEVICE — WRAP COMPRESSION CALF MED

## (undated) DEVICE — GLV 7 ESTEEM BLUE

## (undated) DEVICE — DRSG MASTISOL

## (undated) DEVICE — GOWN XL

## (undated) DEVICE — NDL HYPO SAFE 25G X 1.5"

## (undated) DEVICE — SUT SOFSILK 2-0 18" C-15

## (undated) DEVICE — SUT BIOSYN 4-0 18" P-12

## (undated) DEVICE — DRAIN PENROSE .25" X 18"

## (undated) DEVICE — BLANKET WARMER LOWER ADULT

## (undated) DEVICE — DRAPE TOWEL BLUE 17" X 24"

## (undated) DEVICE — GLV 7.5 ESTEEM BLUE

## (undated) DEVICE — DRAPE LAPAROTOMY TRANSVERSE

## (undated) DEVICE — DRSG TEGADERM 4X4.75"

## (undated) DEVICE — DRAPE LIGHT HANDLE COVER BLUE

## (undated) DEVICE — FOR-ESU VALLEYLAB T7E14842DX: Type: DURABLE MEDICAL EQUIPMENT

## (undated) DEVICE — DRAPE ULTRASOUND PROBE COVER TELESCOPE  5X72"

## (undated) DEVICE — SPONGE DISSECTOR PEANUT

## (undated) DEVICE — DRSG GAUZE 4X4"

## (undated) DEVICE — SOL IRR POUR H2O 250ML

## (undated) DEVICE — GLV 7 PROTEXIS

## (undated) DEVICE — SOL IRR POUR NS 0.9% 500ML

## (undated) DEVICE — PACK MINOR NO DRAPE